# Patient Record
Sex: MALE
[De-identification: names, ages, dates, MRNs, and addresses within clinical notes are randomized per-mention and may not be internally consistent; named-entity substitution may affect disease eponyms.]

---

## 2024-07-10 DIAGNOSIS — K80.20 CALCULUS OF GALLBLADDER W/OUT CHOLECYSTITIS W/OUT OBSTRUCTION: ICD-10-CM

## 2024-07-10 DIAGNOSIS — Z87.11 PERSONAL HISTORY OF PEPTIC ULCER DISEASE: ICD-10-CM

## 2024-07-10 DIAGNOSIS — Z87.891 PERSONAL HISTORY OF NICOTINE DEPENDENCE: ICD-10-CM

## 2024-07-10 DIAGNOSIS — Z87.438 PERSONAL HISTORY OF OTHER DISEASES OF MALE GENITAL ORGANS: ICD-10-CM

## 2024-07-10 DIAGNOSIS — Z86.010 PERSONAL HISTORY OF COLONIC POLYPS: ICD-10-CM

## 2024-07-10 PROBLEM — Z00.00 ENCOUNTER FOR PREVENTIVE HEALTH EXAMINATION: Status: ACTIVE | Noted: 2024-07-10

## 2024-07-10 RX ORDER — DICYCLOMINE HYDROCHLORIDE 20 MG/1
20 TABLET ORAL
Refills: 0 | Status: ACTIVE | COMMUNITY

## 2024-07-10 RX ORDER — LINACLOTIDE 145 UG/1
145 CAPSULE, GELATIN COATED ORAL
Refills: 0 | Status: ACTIVE | COMMUNITY

## 2024-07-10 RX ORDER — OMEPRAZOLE 40 MG/1
40 CAPSULE, DELAYED RELEASE ORAL
Refills: 0 | Status: ACTIVE | COMMUNITY

## 2024-07-10 RX ORDER — DOCUSATE SODIUM 100 MG/1
100 CAPSULE ORAL
Refills: 0 | Status: ACTIVE | COMMUNITY

## 2024-07-10 RX ORDER — PRUCALOPRIDE 2 MG/1
2 TABLET, FILM COATED ORAL
Refills: 0 | Status: ACTIVE | COMMUNITY

## 2024-07-10 RX ORDER — ALFUZOSIN HYDROCHLORIDE 10 MG/1
10 TABLET, EXTENDED RELEASE ORAL
Refills: 0 | Status: ACTIVE | COMMUNITY

## 2024-07-10 RX ORDER — DEXLANSOPRAZOLE 30 MG/1
30 CAPSULE, DELAYED RELEASE ORAL
Refills: 0 | Status: ACTIVE | COMMUNITY

## 2024-07-10 RX ORDER — SIMETHICONE CHEW TAB 80 MG 80 MG
80 TABLET ORAL
Refills: 0 | Status: ACTIVE | COMMUNITY

## 2024-07-10 RX ORDER — LACTULOSE 10 G/15ML
10 SOLUTION ORAL
Refills: 0 | Status: ACTIVE | COMMUNITY

## 2024-07-10 RX ORDER — BISACODYL 5 MG/1
5 TABLET ORAL
Refills: 0 | Status: ACTIVE | COMMUNITY

## 2024-07-10 RX ORDER — LINACLOTIDE 290 UG/1
290 CAPSULE, GELATIN COATED ORAL
Refills: 0 | Status: ACTIVE | COMMUNITY

## 2024-07-10 RX ORDER — SENNOSIDES 8.6 MG/1
8.6 TABLET ORAL
Refills: 0 | Status: ACTIVE | COMMUNITY

## 2024-07-10 RX ORDER — TAMSULOSIN HYDROCHLORIDE 0.4 MG/1
0.4 CAPSULE ORAL
Refills: 0 | Status: ACTIVE | COMMUNITY

## 2024-07-10 RX ORDER — ROSUVASTATIN CALCIUM 10 MG/1
10 TABLET, FILM COATED ORAL
Refills: 0 | Status: ACTIVE | COMMUNITY

## 2024-07-10 RX ORDER — DUTASTERIDE 0.5 MG/1
0.5 CAPSULE, LIQUID FILLED ORAL
Refills: 0 | Status: ACTIVE | COMMUNITY

## 2024-07-10 RX ORDER — URSODIOL 300 MG/1
300 CAPSULE ORAL
Refills: 0 | Status: ACTIVE | COMMUNITY

## 2024-07-17 ENCOUNTER — APPOINTMENT (OUTPATIENT)
Dept: COLORECTAL SURGERY | Facility: CLINIC | Age: 74
End: 2024-07-17
Payer: MEDICARE

## 2024-07-17 DIAGNOSIS — K59.01 SLOW TRANSIT CONSTIPATION: ICD-10-CM

## 2024-07-17 PROCEDURE — 99204 OFFICE O/P NEW MOD 45 MIN: CPT

## 2024-07-18 VITALS
DIASTOLIC BLOOD PRESSURE: 70 MMHG | HEIGHT: 62 IN | WEIGHT: 109 LBS | TEMPERATURE: 97.5 F | BODY MASS INDEX: 20.06 KG/M2 | SYSTOLIC BLOOD PRESSURE: 114 MMHG | HEART RATE: 68 BPM

## 2025-03-19 ENCOUNTER — NON-APPOINTMENT (OUTPATIENT)
Age: 75
End: 2025-03-19

## 2025-03-19 ENCOUNTER — APPOINTMENT (OUTPATIENT)
Dept: COLORECTAL SURGERY | Facility: CLINIC | Age: 75
End: 2025-03-19
Payer: MEDICARE

## 2025-03-19 VITALS
TEMPERATURE: 98.24 F | HEIGHT: 62 IN | DIASTOLIC BLOOD PRESSURE: 67 MMHG | BODY MASS INDEX: 20.24 KG/M2 | SYSTOLIC BLOOD PRESSURE: 125 MMHG | WEIGHT: 110 LBS | HEART RATE: 62 BPM

## 2025-03-19 DIAGNOSIS — K59.01 SLOW TRANSIT CONSTIPATION: ICD-10-CM

## 2025-03-19 PROCEDURE — 99215 OFFICE O/P EST HI 40 MIN: CPT

## 2025-04-08 ENCOUNTER — INPATIENT (INPATIENT)
Facility: HOSPITAL | Age: 75
LOS: 4 days | Discharge: HOME | DRG: 330 | End: 2025-04-13
Attending: SURGERY | Admitting: SURGERY
Payer: MEDICARE

## 2025-04-08 VITALS
HEART RATE: 65 BPM | RESPIRATION RATE: 18 BRPM | WEIGHT: 109.13 LBS | HEIGHT: 64 IN | DIASTOLIC BLOOD PRESSURE: 81 MMHG | SYSTOLIC BLOOD PRESSURE: 124 MMHG | TEMPERATURE: 98 F | OXYGEN SATURATION: 98 %

## 2025-04-08 DIAGNOSIS — Z98.890 OTHER SPECIFIED POSTPROCEDURAL STATES: Chronic | ICD-10-CM

## 2025-04-08 LAB
ALBUMIN SERPL ELPH-MCNC: 4.1 G/DL — SIGNIFICANT CHANGE UP (ref 3.3–5)
ALP SERPL-CCNC: 61 U/L — SIGNIFICANT CHANGE UP (ref 40–120)
ALT FLD-CCNC: 16 U/L — SIGNIFICANT CHANGE UP (ref 10–45)
ANION GAP SERPL CALC-SCNC: 12 MMOL/L — SIGNIFICANT CHANGE UP (ref 5–17)
APPEARANCE UR: CLEAR — SIGNIFICANT CHANGE UP
APTT BLD: 33.9 SEC — SIGNIFICANT CHANGE UP (ref 24.5–35.6)
AST SERPL-CCNC: 19 U/L — SIGNIFICANT CHANGE UP (ref 10–40)
BASOPHILS # BLD AUTO: 0.02 K/UL — SIGNIFICANT CHANGE UP (ref 0–0.2)
BASOPHILS NFR BLD AUTO: 0.4 % — SIGNIFICANT CHANGE UP (ref 0–2)
BILIRUB SERPL-MCNC: 0.3 MG/DL — SIGNIFICANT CHANGE UP (ref 0.2–1.2)
BILIRUB UR-MCNC: NEGATIVE — SIGNIFICANT CHANGE UP
BLD GP AB SCN SERPL QL: NEGATIVE — SIGNIFICANT CHANGE UP
BUN SERPL-MCNC: 17 MG/DL — SIGNIFICANT CHANGE UP (ref 7–23)
CALCIUM SERPL-MCNC: 8.6 MG/DL — SIGNIFICANT CHANGE UP (ref 8.4–10.5)
CHLORIDE SERPL-SCNC: 104 MMOL/L — SIGNIFICANT CHANGE UP (ref 96–108)
CO2 SERPL-SCNC: 26 MMOL/L — SIGNIFICANT CHANGE UP (ref 22–31)
COLOR SPEC: YELLOW — SIGNIFICANT CHANGE UP
CREAT SERPL-MCNC: 1.12 MG/DL — SIGNIFICANT CHANGE UP (ref 0.5–1.3)
DIFF PNL FLD: NEGATIVE — SIGNIFICANT CHANGE UP
EGFR: 69 ML/MIN/1.73M2 — SIGNIFICANT CHANGE UP
EGFR: 69 ML/MIN/1.73M2 — SIGNIFICANT CHANGE UP
EOSINOPHIL # BLD AUTO: 0.07 K/UL — SIGNIFICANT CHANGE UP (ref 0–0.5)
EOSINOPHIL NFR BLD AUTO: 1.4 % — SIGNIFICANT CHANGE UP (ref 0–6)
GLUCOSE SERPL-MCNC: 125 MG/DL — HIGH (ref 70–99)
GLUCOSE UR QL: NEGATIVE MG/DL — SIGNIFICANT CHANGE UP
HCT VFR BLD CALC: 37.4 % — LOW (ref 39–50)
HGB BLD-MCNC: 12.7 G/DL — LOW (ref 13–17)
IMM GRANULOCYTES NFR BLD AUTO: 0.2 % — SIGNIFICANT CHANGE UP (ref 0–0.9)
INR BLD: 0.89 — SIGNIFICANT CHANGE UP (ref 0.85–1.16)
KETONES UR-MCNC: ABNORMAL MG/DL
LEUKOCYTE ESTERASE UR-ACNC: NEGATIVE — SIGNIFICANT CHANGE UP
LYMPHOCYTES # BLD AUTO: 0.95 K/UL — LOW (ref 1–3.3)
LYMPHOCYTES # BLD AUTO: 18.5 % — SIGNIFICANT CHANGE UP (ref 13–44)
MCHC RBC-ENTMCNC: 30.3 PG — SIGNIFICANT CHANGE UP (ref 27–34)
MCHC RBC-ENTMCNC: 34 G/DL — SIGNIFICANT CHANGE UP (ref 32–36)
MCV RBC AUTO: 89.3 FL — SIGNIFICANT CHANGE UP (ref 80–100)
MONOCYTES # BLD AUTO: 0.44 K/UL — SIGNIFICANT CHANGE UP (ref 0–0.9)
MONOCYTES NFR BLD AUTO: 8.6 % — SIGNIFICANT CHANGE UP (ref 2–14)
NEUTROPHILS # BLD AUTO: 3.65 K/UL — SIGNIFICANT CHANGE UP (ref 1.8–7.4)
NEUTROPHILS NFR BLD AUTO: 70.9 % — SIGNIFICANT CHANGE UP (ref 43–77)
NITRITE UR-MCNC: NEGATIVE — SIGNIFICANT CHANGE UP
NRBC BLD AUTO-RTO: 0 /100 WBCS — SIGNIFICANT CHANGE UP (ref 0–0)
PH UR: 7 — SIGNIFICANT CHANGE UP (ref 5–8)
PLATELET # BLD AUTO: 172 K/UL — SIGNIFICANT CHANGE UP (ref 150–400)
POTASSIUM SERPL-MCNC: 3.8 MMOL/L — SIGNIFICANT CHANGE UP (ref 3.5–5.3)
POTASSIUM SERPL-SCNC: 3.8 MMOL/L — SIGNIFICANT CHANGE UP (ref 3.5–5.3)
PROT SERPL-MCNC: 6.5 G/DL — SIGNIFICANT CHANGE UP (ref 6–8.3)
PROT UR-MCNC: NEGATIVE MG/DL — SIGNIFICANT CHANGE UP
PROTHROM AB SERPL-ACNC: 10.2 SEC — SIGNIFICANT CHANGE UP (ref 9.9–13.4)
RBC # BLD: 4.19 M/UL — LOW (ref 4.2–5.8)
RBC # FLD: 14 % — SIGNIFICANT CHANGE UP (ref 10.3–14.5)
RH IG SCN BLD-IMP: POSITIVE — SIGNIFICANT CHANGE UP
SODIUM SERPL-SCNC: 142 MMOL/L — SIGNIFICANT CHANGE UP (ref 135–145)
SP GR SPEC: >1.03 — HIGH (ref 1–1.03)
UROBILINOGEN FLD QL: 1 MG/DL — SIGNIFICANT CHANGE UP (ref 0.2–1)
WBC # BLD: 5.14 K/UL — SIGNIFICANT CHANGE UP (ref 3.8–10.5)
WBC # FLD AUTO: 5.14 K/UL — SIGNIFICANT CHANGE UP (ref 3.8–10.5)

## 2025-04-08 PROCEDURE — 93010 ELECTROCARDIOGRAM REPORT: CPT

## 2025-04-08 PROCEDURE — 99285 EMERGENCY DEPT VISIT HI MDM: CPT

## 2025-04-08 PROCEDURE — 74177 CT ABD & PELVIS W/CONTRAST: CPT | Mod: 26

## 2025-04-08 PROCEDURE — 99222 1ST HOSP IP/OBS MODERATE 55: CPT

## 2025-04-08 PROCEDURE — 71045 X-RAY EXAM CHEST 1 VIEW: CPT | Mod: 26

## 2025-04-08 RX ORDER — ROSUVASTATIN CALCIUM 5 MG/1
10 TABLET, FILM COATED ORAL AT BEDTIME
Refills: 0 | Status: DISCONTINUED | OUTPATIENT
Start: 2025-04-08 | End: 2025-04-10

## 2025-04-08 RX ORDER — BISACODYL 5 MG
10 TABLET, DELAYED RELEASE (ENTERIC COATED) ORAL ONCE
Refills: 0 | Status: COMPLETED | OUTPATIENT
Start: 2025-04-08 | End: 2025-04-08

## 2025-04-08 RX ORDER — TAMSULOSIN HYDROCHLORIDE 0.4 MG/1
0.4 CAPSULE ORAL AT BEDTIME
Refills: 0 | Status: DISCONTINUED | OUTPATIENT
Start: 2025-04-08 | End: 2025-04-09

## 2025-04-08 RX ORDER — ACETAMINOPHEN 500 MG/5ML
750 LIQUID (ML) ORAL EVERY 6 HOURS
Refills: 0 | Status: DISCONTINUED | OUTPATIENT
Start: 2025-04-08 | End: 2025-04-10

## 2025-04-08 RX ORDER — IOHEXOL 350 MG/ML
30 INJECTION, SOLUTION INTRAVENOUS ONCE
Refills: 0 | Status: COMPLETED | OUTPATIENT
Start: 2025-04-08 | End: 2025-04-08

## 2025-04-08 RX ORDER — HEPARIN SODIUM 1000 [USP'U]/ML
5000 INJECTION INTRAVENOUS; SUBCUTANEOUS EVERY 8 HOURS
Refills: 0 | Status: DISCONTINUED | OUTPATIENT
Start: 2025-04-08 | End: 2025-04-10

## 2025-04-08 RX ORDER — POLYETHYLENE GLYCOL 3350 17 G/17G
17 POWDER, FOR SOLUTION ORAL ONCE
Refills: 0 | Status: COMPLETED | OUTPATIENT
Start: 2025-04-08 | End: 2025-04-08

## 2025-04-08 RX ORDER — ONDANSETRON HCL/PF 4 MG/2 ML
4 VIAL (ML) INJECTION EVERY 6 HOURS
Refills: 0 | Status: DISCONTINUED | OUTPATIENT
Start: 2025-04-08 | End: 2025-04-10

## 2025-04-08 RX ADMIN — IOHEXOL 30 MILLILITER(S): 350 INJECTION, SOLUTION INTRAVENOUS at 12:29

## 2025-04-08 RX ADMIN — Medication 10 MILLIGRAM(S): at 18:49

## 2025-04-08 RX ADMIN — HEPARIN SODIUM 5000 UNIT(S): 1000 INJECTION INTRAVENOUS; SUBCUTANEOUS at 21:46

## 2025-04-08 RX ADMIN — POLYETHYLENE GLYCOL 3350 17 GRAM(S): 17 POWDER, FOR SOLUTION ORAL at 18:49

## 2025-04-08 RX ADMIN — TAMSULOSIN HYDROCHLORIDE 0.4 MILLIGRAM(S): 0.4 CAPSULE ORAL at 21:45

## 2025-04-08 NOTE — H&P ADULT - HISTORY OF PRESENT ILLNESS
75yo Cantonese-speaking Male pt with PMH of Gastric ulcers, incompetent ileocecal valve, bowel obstruction (2021) and PSHx of Exlap with Mike patch repair (1980s) and gastrectomy (1980) that is a patient of Dr. De Luna being treated for chronic constipation associated with increased bloating. Patient has been seen by many outpatient GI doctors and has failed trials of miralax, enemas, etc. Patient last saw Dr. De Luna on 3/19 and discussed potential total colectomy with possible ileosigmoid anastomosis with DLI to relieve chronic constipation 2/2 delayed transit and was scheduled for surgery 4/10. 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 Distal ileal bowel loops are also dilated" and was called by outpatient office today to come to ED for admission and preop optimization for surgery Thursday.    In the ED, pt afebrile, nontachycardic, normotensive, and satting on RA. On exam, Abdomen is soft, moderately distended, NT without rebound or guarding. Labs including CBC and CMP unremarkable. Patient currently denies pain, nausea, emesis, fever/chills, or constipation. Patient's last BM was this morning. Patient reports since CT scan last week he has been taking chinese herbal supplements that have caused relief of symptoms leading to 2-3x daily BMs (small caliber). Patient is inquiring if surgery is necessary as he has been feeling better for the last few days. He still reports bloated feeling but states his herbal supplement relieves bloating discomfort with flatus. 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 After repeat CT imaging results was discussed with patient, patient agreed to be admitted and undergo surgery.    PMH: Gastric ulcers, incompetent ileocecal valve, bowel obstruction (2021)  PSHx: Exlap with Mike patch repair (1980s) and gastrectomy (1980)  Medications: linzess, motegrity, and senna  Allergies: NKDA  Social Hx: Former 20pck yr smoker (quit 20yrs ago)  Family Hx: Denies family hx of IBS, Crohn's, UC, or colon cancer.  Last colonoscopy: May 2024- moderately congested erythematous mucosa in the transverse colon, in the ascending colon and in the cecum. There was narrowing in the transverse colon with proximal dilation which was suctioned to decompress on withdrawal. No discrete mass or stricture identified. Found to have polyps in the anus, s/p biopsied. resection not attempted due to poor prep  Last EGD: 2-3yrs ago- chronic inflammation  
cont cardura

## 2025-04-08 NOTE — H&P ADULT - NSHPLABSRESULTS_GEN_ALL_CORE
LABS:                     12.7   5.14  )-----------( 172      ( 08 Apr 2025 10:47 )             37.4   04-08  142  |  104  |  17  ----------------------------<  125[H]  3.8   |  26  |  1.12  Ca    8.6      08 Apr 2025 10:47  TPro  6.5  /  Alb  4.1  /  TBili  0.3  /  DBili  x   /  AST  19  /  ALT  16  /  AlkPhos  61  04-08  PT/INR - ( 08 Apr 2025 10:47 )   PT: 10.2 sec;   INR: 0.89     PTT - ( 08 Apr 2025 10:47 )  PTT:33.9 sec    CT Abdomen and Pelvis w/ Oral Cont and w/ IV Cont:   ACC: [id number] EXAM:  CT ABDOMEN AND PELVIS OC IC   ORDERED BY: SHANTANU BARNEY     PROCEDURE DATE:  04/08/2025          INTERPRETATION:  CLINICAL INFORMATION: Lower abdominal pain.    COMPARISON: Outside CT abdomen and pelvis and MRI May 1, 2024,    CONTRAST/COMPLICATIONS:  IV Contrast: Isovue 370  90 cc administered   10 cc discarded  Oral Contrast: Omnipaque 350  .    PROCEDURE:  CT of the Abdomen and Pelvis was performed.  Sagittal and coronal reformats were performed.    FINDINGS:  LOWER CHEST: Linear scar at right lung base. Right posterior basilar   subpleural micronodule.    LIVER: Within normal limits.  BILE DUCTS: Unchanged mild intra hepatic biliary ductal dilatation,   similar to prior. No significant extrahepatic duct dilatation.  GALLBLADDER: Within normal limits.  SPLEEN: Within normal limits.  PANCREAS: Within normal limits.  ADRENALS: Within normal limits.  KIDNEYS/URETERS: Within normal limits.    BLADDER: Within normal limits.  REPRODUCTIVE ORGANS: Prostate withinnormal limits.    BOWEL: Air and liquid-filled markedly dilated transverse colon up to 14.4   cm with marked smooth luminal narrowing/transition point at the level of   the splenic flexure, similar to prior exams. No mass. Small air and fecal   matter is present distally in nondilated large bowel and rectosigmoid.   Right colon is air-filled dilated measuring up to 8.4 cm.  Appendix is   normal. No pneumatosis or significant wall thickening. Distal small bowel   loops are filled with fecal matterdue to delayed transit or incompetent   sphincter. Terminal ileum is collapsed.  PERITONEUM/RETROPERITONEUM: Small pelvic free fluid.  VESSELS: Atherosclerotic changes.  LYMPH NODES: No lymphadenopathy.  ABDOMINAL WALL: Within normal limits.  BONES: Degenerative changes.    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   No evidence of ischemia.    --- End of Report ---          SVEN BARRERA MD; Resident Radiologist  This document has been electronically signed.  EMERSON MARTÍNEZ MD; Attending Radiologist  This document has been electronically signed. Apr 8 2025  3:29PM (04-08-25 @ 14:01)

## 2025-04-08 NOTE — PATIENT PROFILE ADULT - FALL HARM RISK - UNIVERSAL INTERVENTIONS
Bed in lowest position, wheels locked, appropriate side rails in place/Call bell, personal items and telephone in reach/Instruct patient to call for assistance before getting out of bed or chair/Non-slip footwear when patient is out of bed/[place] to call system/Physically safe environment - no spills, clutter or unnecessary equipment/Purposeful Proactive Rounding/Room/bathroom lighting operational, light cord in reach

## 2025-04-08 NOTE — ED PROVIDER NOTE - OBJECTIVE STATEMENT
jeramy  #[id number]    73 y/o m presents c/o persistent lower abd pain, constipation.  Pt stating sx have been ongoing for several months, following with surgeon Dr. De Luna outpatient.  Pt stating  he is scheduled to have hemicolectomy this week with Dr. De Luna for his symptoms but his pain worsened over the weekend and states he called the office and was told to come to ED.  Pt reports mild discomfort currently which has been waxing/waning.  Denies fever, chills, dysuria, n/v, CP, SOB, all other ROS negative.

## 2025-04-08 NOTE — CONSULT NOTE ADULT - ASSESSMENT
75yo Cantonese/Mandrain-speaking Male pt with PMH of Gastric ulcers, incompetent ileocecal valve, bowel obstruction (2021) and PSHx of Exlap with Mike patch repair (1980s) and gastrectomy (1980) that is a patient of Dr. De Luna being treated for chronic constipation associated with increased bloating. Patient has been seen by many outpatient GI doctors and has failed trials of miralax, enemas, etc. Patient last saw Dr. De Luna on 3/19 and discussed potential total colectomy with possible ileosigmoid anastomosis with DLI to relieve chronic constipation 2/2 delayed transit and was scheduled for surgery 4/10. 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    - diet per surgery  - if npo - to give IVF for hydration  - hx of recurrent constipation    - hx of BPH taking meds- did not recall the name  would give Tamsulosin 0.4 mg po q daily    reported HLD and stating medication at home.   no prior cardiac history ,   baseline good functional status reported.   would like to see EKG     - pre-op assesement to be completed  current information - does not have contra-indication for surgery     thank you for allowing medicine to participate in the care, dw primary team. explained to patient ( writer can speak Mandarin and can communicate directly with patient   he was sent in directly from pcp office - wife at home   pcp is Fausto Gonzalez.

## 2025-04-08 NOTE — PATIENT PROFILE ADULT - ARRIVAL FROM
Home

## 2025-04-08 NOTE — ED PROVIDER NOTE - NS ED MD DISPO ADMIT [facility] PALLIATIVE CARE
No changes to pump settings     Move Dexcom sensor to different parts of body,  avoid areas with loose skin     Avoid disconnecting from pump for more than 30min-1hr at a time     Call to request switch to Dexcom G7     Start Rosuvastatin 5mg once weekly for 1 month.  If tolerated, try increasing to 3 days/week for 1 month.  Then, increase to daily as tolerated    Go for fasting blood work before next visit     ____________________________________________________________________________________    Get [place] copy most recent 1040 form asap to start process for  patient assistance    Patient Assistance Program    You may be able to get some of your medications covered at no cost to you if:   You currently have prescription insurance.  You meet the income requirements below based on your household size.     Total Number of People  in your Household: 1 2 3 4 5   Annual Household Income: $60,240 $81,760 $103,280 $124,800 $146,320     NEXT STEPS:  If you believe you qualify for the  Patient Assistance Program, you MUST complete the following steps to start the application process.   Locate one of the following required documents for proof of income:   A copy of your most recent 1040 Form.  If you do NOT file taxes, a copy of your most recent Social Security Benefit Statement.   Drop off or fax ([phone]) to our office.   Once received, one of our pharmacists will contact you to continue the process.     PLEASE ALLOW 1-2 WEEKS TO HEAR FROM THE PHARMACIST.     NONE

## 2025-04-08 NOTE — ED ADULT TRIAGE NOTE - CHIEF COMPLAINT QUOTE
Pt BIB NP from [facility]. Per NP "Patient is scheduled for surgery for hemicolectomy on Thursday and presents today with abdominal pain and [facility] wanted patient to be preadmited"  Hx chronic constipation, bph, GERD, HLD.  Pt endorsed abd discomfort and distention. Able to pas gas this morning.

## 2025-04-08 NOTE — ED ADULT NURSE NOTE - NSFALLRISK_ED_ALL_ED
Consults    Patient Care Team:  Lindy Munroe PA-C as PCP - General (Physician Assistant)    Chief complaint: Left hand ischemia  Subjective     37-year-old male admitted to the observation unit presenting with left hand pain and ischemia of the distal portions of the fourth and fifth digits of the left hand.  He is an IV drug abuser who states 2 weeks ago he had an episode of relapse and injected into his left antecubital fossa.  The patient is afebrile.  No evidence of PE on CT PE protocol.  Echocardiogram is pending but no other evidence for septic emboli symptomatically or clinically as the only areas of ischemia are in the left hand in the distal fourth and fifth digits with no evidence of wet gangrene.        Review of Systems   Constitutional: Negative for activity change, appetite change, chills and fever.   HENT: Negative for sore throat and trouble swallowing.    Eyes: Negative for visual disturbance.   Respiratory: Negative for cough and shortness of breath.    Cardiovascular: Negative for chest pain and palpitations.   Gastrointestinal: Negative for abdominal distention, abdominal pain, blood in stool, constipation, diarrhea, nausea and vomiting.   Endocrine: Negative for cold intolerance and heat intolerance.   Genitourinary: Negative for dysuria.   Musculoskeletal: Negative for joint swelling.   Skin: Negative for color change, rash and wound.   Allergic/Immunologic: Negative for immunocompromised state.   Neurological: Negative for dizziness, seizures, weakness and headaches.   Hematological: Negative for adenopathy. Does not bruise/bleed easily.   Psychiatric/Behavioral: Negative for agitation and confusion.        Past Medical History:   Diagnosis Date   • Depression    • Hypertension    ,   Past Surgical History:   Procedure Laterality Date   • EYE SURGERY     , History reviewed. No pertinent family history.,   Social History   Substance Use Topics   • Smoking status: Current Every Day Smoker      Packs/day: 1.00   • Smokeless tobacco: Never Used   • Alcohol use No   ,   Prescriptions Prior to Admission   Medication Sig Dispense Refill Last Dose   • buprenorphine-naloxone (SUBOXONE) 8-2 MG film film Place 1 film under the tongue 3 (Three) Times a Day.   10/9/2018 at AM   • clonazePAM (KlonoPIN) 2 MG tablet Take 2 mg by mouth 2 (Two) Times a Day.   10/9/2018 at AM   • gabapentin (NEURONTIN) 800 MG tablet Take 800 mg by mouth 3 (Three) Times a Day.   10/9/2018 at AM   • lisinopril (PRINIVIL,ZESTRIL) 10 MG tablet Take 10 mg by mouth Daily.   10/6/2018 at Unknown Time   , Scheduled Meds:    aspirin 325 mg Oral Daily   buprenorphine-naloxone 1 tablet Sublingual TID   clonazePAM 2 mg Oral BID   enoxaparin 40 mg Subcutaneous Nightly   gabapentin 800 mg Oral Q8H   lisinopril 10 mg Oral Daily   povidone-iodine  Topical Q12H   sodium chloride 3 mL Intravenous Q12H   , Continuous Infusions:    sodium chloride 100 mL/hr Last Rate: 100 mL/hr (10/10/18 1300)   , PRN Meds:  •  acetaminophen  •  diphenhydrAMINE  •  nitroglycerin  •  ondansetron  •  sodium chloride  •  sodium chloride and Allergies:  Vancomycin    Objective      Vital Signs  Temp:  [96.2 °F (35.7 °C)-97.6 °F (36.4 °C)] 96.5 °F (35.8 °C)  Heart Rate:  [60-97] 65  Resp:  [16-18] 18  BP: (101-153)/(68-89) 133/87    Physical Exam   Constitutional: He is oriented to person, place, and time. He appears well-developed.   HENT:   Head: Normocephalic and atraumatic.   Mouth/Throat: Mucous membranes are normal.   Eyes: Pupils are equal, round, and reactive to light. Conjunctivae are normal.   Neck: Neck supple. No JVD present. No tracheal deviation present. No thyromegaly present.   Cardiovascular: Normal rate and regular rhythm.  Exam reveals no gallop and no friction rub.    No murmur heard.  Pulmonary/Chest: Effort normal and breath sounds normal.   Abdominal: Soft. He exhibits no distension. There is no splenomegaly or hepatomegaly. There is no tenderness. No  hernia.   Musculoskeletal: Normal range of motion. He exhibits no deformity.   Left hand distal fourth and fifth digits with ischemic change without necrosis   Neurological: He is alert and oriented to person, place, and time.   Skin: Skin is warm and dry.   Psychiatric: He has a normal mood and affect.       Results Review:    I reviewed the patient's new clinical results.        Assessment/Plan       Septic embolism (CMS/[facility])      Assessment:  (37-year-old male with distal ischemia of the left fourth and fifth digits of the hand.  No evidence of necrosis.  He has palpable radial and ulnar pulses in the left.).     Plan:   (Pain with Betadine daily daily and continue to monitor.  Hopefully he will develop collateral flow and had minimal necrosis of tissue or tissue loss.  As long as there is no evidence of wet gangrene if necrosis develops or persists there will likely autoamputated and require no surgical amputation.  If he develops wet gangrene may require surgical amputation.  Follow-up in my office in one week for evaluation.).       I discussed the patients findings and my recommendations with patient, family and consulting provider    Capo Storm MD  10/10/18  3:34 PM         No

## 2025-04-08 NOTE — ED PROVIDER NOTE - CLINICAL SUMMARY MEDICAL DECISION MAKING FREE TEXT BOX
[age] y/o m presents c/o ongoing sx of lower abd pain, constipation, advised by surgeon Dr. De Luna office to come to ED.  Pt afebrile in ED, nontoxic appearing on exam, mild tenderness to lower abd.  CTs done outpatient suggesting decreased colonic motility.  Pt seen by surgery in ED, recommend repeat CT a/p with oral and IV contrast, will f/u results and surgery recs.

## 2025-04-08 NOTE — CONSULT NOTE ADULT - SUBJECTIVE AND OBJECTIVE BOX
HPI: from record review and from patient.     75yo Cantonese/Mandrain-speaking Male pt with PMH of Gastric ulcers, incompetent ileocecal valve, bowel obstruction (2021) and PSHx of Exlap with Mike patch repair (1980s) and gastrectomy (1980) that is a patient of Dr. De Luna being treated for chronic constipation associated with increased bloating. Patient has been seen by many outpatient GI doctors and has failed trials of miralax, enemas, etc. Patient last saw Dr. De Luna on 3/19 and discussed potential total colectomy with possible ileosigmoid anastomosis with DLI to relieve chronic constipation 2/2 delayed transit and was scheduled for surgery 4/10. 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 Distal ileal bowel loops are also dilated" and was called by outpatient office today to come to ED for admission and preop optimization for surgery Thursday.    In the ED, pt afebrile, nontachycardic, normotensive, and satting on RA. On exam, Abdomen is soft, moderately distended, NT without rebound or guarding. Labs including CBC and CMP unremarkable. Patient currently denies pain, nausea, emesis, fever/chills, or constipation. Patient's last BM was this morning. Patient reports since CT scan last week he has been taking chinese herbal supplements that have caused relief of symptoms leading to 2-3x daily BMs (small caliber). Patient is inquiring if surgery is necessary as he has been feeling better for the last few days. He still reports bloated feeling but states his herbal supplement relieves bloating discomfort with flatus. 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 After repeat CT imaging results was discussed with patient, patient agreed to be admitted and undergo surgery.    pt seen for medicine [facility]  - reported no significant cardiac disease.   - tolerated prior procedure well  - lives with wife.   - can ambulate for multiple blocks on level ground - fatigue in legs after climbing stairs, no chest pain.       PMH: Gastric ulcers, incompetent ileocecal valve, bowel obstruction (2021)  PSHx: Exlap with Mike patch repair (1980s) and gastrectomy (1980)  Medications: linzess, motegrity, and senna  Allergies: NKDA  Social Hx: Former 20pck yr smoker (quit 20yrs ago)  Family Hx: Denies family hx of IBS, Crohn's, UC, or colon cancer.  Last colonoscopy: May 2024- moderately congested erythematous mucosa in the transverse colon, in the ascending colon and in the cecum. There was narrowing in the transverse colon with proximal dilation which was suctioned to decompress on withdrawal. No discrete mass or stricture identified. Found to have polyps in the anus, s/p biopsied. resection not attempted due to poor prep  Last EGD: 2-3yrs ago- chronic inflammation   (08 Apr 2025 16:32)      PAST MEDICAL & SURGICAL HISTORY:  Gastric ulcer      H/O exploratory laparotomy        Home Medications: reported taking cholesterol meds and medication for prostate and for constipation.     Allergies    No Known Allergies    Intolerances      FAMILY HISTORY:  No pertinent family history in first degree relatives      Social History: live with wife.       REVIEW OF SYSTEMS:  12 points system reviewed all negative except in [place].        CURRENT MEDICATIONS:   acetaminophen   IVPB .. 750 milliGRAM(s) IV Intermittent every 6 hours PRN  bisacodyl 10 milliGRAM(s) Oral once  heparin   Injectable 5000 Unit(s) SubCutaneous every 8 hours  ondansetron Injectable 4 milliGRAM(s) IV Push every 6 hours PRN  polyethylene glycol 3350 17 Gram(s) Oral once      VITAL SIGNS, INS/OUTS (last 24 hours):  Vital Signs Last 24 Hrs  T(C): 36.4 (08 Apr 2025 15:52), Max: 36.5 (08 Apr 2025 10:00)  T(F): 97.6 (08 Apr 2025 15:52), Max: 97.7 (08 Apr 2025 10:00)  HR: 54 (08 Apr 2025 15:52) (54 - 65)  BP: 128/67 (08 Apr 2025 15:52) (124/81 - 128/67)  BP(mean): --  RR: 16 (08 Apr 2025 15:52) (16 - 18)  SpO2: 99% (08 Apr 2025 15:52) (98% - 99%)    Parameters below as of 08 Apr 2025 15:52  Patient On (Oxygen Delivery Method): room air      I&O's Summary      PHYSICAL EXAM:  General exam: awake alert, thin, standing near the bed, saturating well on room air   speaking full sentence.   Neck: supple, trachea at midline  CVS : RRR, +S1/S2  Pulm: good air entry   Abd:  distended-bloated, bs present, no localized tenderness  Skin: warm and dry,  Ext:  no edema, no tenderness.  Neuro: AOx3, no gross focal neurological deficits  [place] : none.     BASIC LABS:                        12.7   5.14  )-----------( 172      ( 08 Apr 2025 10:47 )             37.4     04-08    142  |  104  |  17  ----------------------------<  125[H]  3.8   |  26  |  1.12    Ca    8.6      08 Apr 2025 10:47    TPro  6.5  /  Alb  4.1  /  TBili  0.3  /  DBili  x   /  AST  19  /  ALT  16  /  AlkPhos  61  04-08    PT/INR - ( 08 Apr 2025 10:47 )   PT: 10.2 sec;   INR: 0.89          PTT - ( 08 Apr 2025 10:47 )  PTT:33.9 sec  Urinalysis Basic - ( 08 Apr 2025 10:47 )    Color: Yellow / Appearance: Clear / SG: >1.030 / pH: x  Gluc: 125 mg/dL / Ketone: Trace mg/dL  / Bili: Negative / Urobili: 1.0 mg/dL   Blood: x / Protein: Negative mg/dL / Nitrite: Negative   Leuk Esterase: Negative / RBC: x / WBC x   Sq Epi: x / Non Sq Epi: x / Bacteria: x      CAPILLARY BLOOD GLUCOSE          OTHER LABS:        MICRODATA:    Urinalysis with Rflx Culture (collected 08 Apr 2025 10:47)        IMAGING:- CT abdomen - Markedly dilated transverse colon, transition point at the splenic   flexure, similar to 5/1/2024, likely pseudoobstruction/[place] syndrome.   No evidence of ischemia.    chest x ray -: Heart size, mediastinal and hilar contours are within normal   limits for the patient's stated age. Again noted is calcification of the   aortic knob. The lung zones are clear. No acute soft tissue or osseous   pathology.      EKG: to locate     #Diet -       #DVT PPx -

## 2025-04-08 NOTE — H&P ADULT - ASSESSMENT
73yo Cantonese-speaking Male pt with PMH of Gastric ulcers, incompetent ileocecal valve, bowel obstruction (2021) and PSHx of Exlap with Mike patch repair (1980s) and gastrectomy (1980) that is a patient of Dr. De Luna being treated for chronic constipation associated with increased bloating which has failed multiple medical interventions. Repeat CT imaging consisted with severely dilated colon unchanged from CT scan last year. Patient admitted to [facility] surgery for further management of colonic dysmotility.    CLD  Pain and nausea prn  Home meds as appropriate  Miralax and dulcolax  SQH/SCD/OOBA  AM Labs

## 2025-04-08 NOTE — ED PROVIDER NOTE - ATTENDING APP SHARED VISIT CONTRIBUTION OF CARE
Pt is a 75yo m, Cantonese speaking, followed by Dr. De Luna for lower abd pain, constipation x months, with outpt ct a/p showing decreased colonic motility w/ colonic inertia, no obstruction, planned for a tyrell-colectomy, who was referred to ed by Dr. De Luna for worsening abd pain. No n/v/d, bloody stools, fever, chills. Is passing gas. Afebrile. HDS. Pt is well appearing. Abd soft, mildly distended, non-tender, no g/r. Labs unremarkable. Pt seen by surgery and requesting ct a/p to r/o obsruction. Dispo pending ct results and surg consult recommendations. hx obtained via Cantonese speaking .

## 2025-04-08 NOTE — ED ADULT NURSE NOTE - CAS TRG GEN SKIN COLOR
89 y/o m w/ PMH of AS s/p TAVR, CAD s/p DEWAYNE and CABG, hypothyroidism, HTN, HLD p/w Fall out of bed today while trying to go to the bathroom got pinned between the bed and the wall the family had to help get him up has no signs of trauma no real complaints able to actively range both hips at this time and knees ankles no signs of head injury at his baseline all labs were checked yesterday for completion sake pelvic x-ray was this time.
Normal for race

## 2025-04-08 NOTE — ED ADULT NURSE NOTE - OBJECTIVE STATEMENT
pt is a 75 yo M referred by colorectal outpatient office for hemicolectomy. c/o chronic bloating and constipation. last BM earlier today.  pt in nad, a&ox4. abd round and distended, nontender. ambulatory independently.

## 2025-04-09 LAB
ANION GAP SERPL CALC-SCNC: 10 MMOL/L — SIGNIFICANT CHANGE UP (ref 5–17)
BUN SERPL-MCNC: 13 MG/DL — SIGNIFICANT CHANGE UP (ref 7–23)
CALCIUM SERPL-MCNC: 8.5 MG/DL — SIGNIFICANT CHANGE UP (ref 8.4–10.5)
CHLORIDE SERPL-SCNC: 108 MMOL/L — SIGNIFICANT CHANGE UP (ref 96–108)
CO2 SERPL-SCNC: 23 MMOL/L — SIGNIFICANT CHANGE UP (ref 22–31)
CREAT SERPL-MCNC: 1.03 MG/DL — SIGNIFICANT CHANGE UP (ref 0.5–1.3)
EGFR: 77 ML/MIN/1.73M2 — SIGNIFICANT CHANGE UP
EGFR: 77 ML/MIN/1.73M2 — SIGNIFICANT CHANGE UP
GLUCOSE SERPL-MCNC: 76 MG/DL — SIGNIFICANT CHANGE UP (ref 70–99)
HCT VFR BLD CALC: 37.4 % — LOW (ref 39–50)
HGB BLD-MCNC: 12.5 G/DL — LOW (ref 13–17)
MAGNESIUM SERPL-MCNC: 2.3 MG/DL — SIGNIFICANT CHANGE UP (ref 1.6–2.6)
MCHC RBC-ENTMCNC: 30.4 PG — SIGNIFICANT CHANGE UP (ref 27–34)
MCHC RBC-ENTMCNC: 33.4 G/DL — SIGNIFICANT CHANGE UP (ref 32–36)
MCV RBC AUTO: 91 FL — SIGNIFICANT CHANGE UP (ref 80–100)
NRBC BLD AUTO-RTO: 0 /100 WBCS — SIGNIFICANT CHANGE UP (ref 0–0)
PHOSPHATE SERPL-MCNC: 3.4 MG/DL — SIGNIFICANT CHANGE UP (ref 2.5–4.5)
PLATELET # BLD AUTO: 161 K/UL — SIGNIFICANT CHANGE UP (ref 150–400)
POTASSIUM SERPL-MCNC: 3.8 MMOL/L — SIGNIFICANT CHANGE UP (ref 3.5–5.3)
POTASSIUM SERPL-SCNC: 3.8 MMOL/L — SIGNIFICANT CHANGE UP (ref 3.5–5.3)
RBC # BLD: 4.11 M/UL — LOW (ref 4.2–5.8)
RBC # FLD: 14.1 % — SIGNIFICANT CHANGE UP (ref 10.3–14.5)
SODIUM SERPL-SCNC: 141 MMOL/L — SIGNIFICANT CHANGE UP (ref 135–145)
WBC # BLD: 4.57 K/UL — SIGNIFICANT CHANGE UP (ref 3.8–10.5)
WBC # FLD AUTO: 4.57 K/UL — SIGNIFICANT CHANGE UP (ref 3.8–10.5)

## 2025-04-09 PROCEDURE — 99231 SBSQ HOSP IP/OBS SF/LOW 25: CPT

## 2025-04-09 PROCEDURE — 99232 SBSQ HOSP IP/OBS MODERATE 35: CPT

## 2025-04-09 RX ORDER — SODIUM CHLORIDE 9 G/1000ML
1000 INJECTION, SOLUTION INTRAVENOUS
Refills: 0 | Status: DISCONTINUED | OUTPATIENT
Start: 2025-04-09 | End: 2025-04-10

## 2025-04-09 RX ORDER — BISACODYL 5 MG
10 TABLET, DELAYED RELEASE (ENTERIC COATED) ORAL
Refills: 0 | Status: COMPLETED | OUTPATIENT
Start: 2025-04-09 | End: 2025-04-09

## 2025-04-09 RX ORDER — TAMSULOSIN HYDROCHLORIDE 0.4 MG/1
0.8 CAPSULE ORAL AT BEDTIME
Refills: 0 | Status: DISCONTINUED | OUTPATIENT
Start: 2025-04-09 | End: 2025-04-10

## 2025-04-09 RX ORDER — TAMSULOSIN HYDROCHLORIDE 0.4 MG/1
2 CAPSULE ORAL
Refills: 0 | DISCHARGE

## 2025-04-09 RX ORDER — PRUCALOPRIDE 2 MG/1
1 TABLET ORAL
Refills: 0 | DISCHARGE

## 2025-04-09 RX ORDER — NEOMYCIN SULFATE 87.5 MG/5ML
1000 SOLUTION ORAL
Refills: 0 | Status: COMPLETED | OUTPATIENT
Start: 2025-04-09 | End: 2025-04-09

## 2025-04-09 RX ORDER — DUTASTERIDE 0.5 MG/1
1 CAPSULE, LIQUID FILLED ORAL
Refills: 0 | DISCHARGE

## 2025-04-09 RX ORDER — B1/B2/B3/B5/B6/B12/VIT C/FOLIC 500-0.5 MG
0 TABLET ORAL
Refills: 0 | DISCHARGE

## 2025-04-09 RX ORDER — METRONIDAZOLE 250 MG
500 TABLET ORAL
Refills: 0 | Status: COMPLETED | OUTPATIENT
Start: 2025-04-09 | End: 2025-04-09

## 2025-04-09 RX ORDER — CALCITRIOL 0.5 UG/1
50000 CAPSULE, GELATIN COATED ORAL
Refills: 0 | DISCHARGE

## 2025-04-09 RX ORDER — DOCUSATE SODIUM 100 MG
1 CAPSULE ORAL
Refills: 0 | DISCHARGE

## 2025-04-09 RX ORDER — MAGNESIUM OXIDE/MAG AA CHELATE 133 MG
4 TABLET ORAL
Refills: 0 | DISCHARGE

## 2025-04-09 RX ORDER — POLYETHYLENE GLYCOL-3350 AND ELECTROLYTES 236; 6.74; 5.86; 2.97; 22.74 G/274.31G; G/274.31G; G/274.31G; G/274.31G; G/274.31G
4000 POWDER, FOR SOLUTION ORAL ONCE
Refills: 0 | Status: COMPLETED | OUTPATIENT
Start: 2025-04-09 | End: 2025-04-09

## 2025-04-09 RX ADMIN — NEOMYCIN SULFATE 1000 MILLIGRAM(S): 87.5 SOLUTION ORAL at 17:15

## 2025-04-09 RX ADMIN — ROSUVASTATIN CALCIUM 10 MILLIGRAM(S): 5 TABLET, FILM COATED ORAL at 21:30

## 2025-04-09 RX ADMIN — HEPARIN SODIUM 5000 UNIT(S): 1000 INJECTION INTRAVENOUS; SUBCUTANEOUS at 06:34

## 2025-04-09 RX ADMIN — POLYETHYLENE GLYCOL-3350 AND ELECTROLYTES 4000 MILLILITER(S): 236; 6.74; 5.86; 2.97; 22.74 POWDER, FOR SOLUTION ORAL at 09:50

## 2025-04-09 RX ADMIN — Medication 500 MILLIGRAM(S): at 21:29

## 2025-04-09 RX ADMIN — HEPARIN SODIUM 5000 UNIT(S): 1000 INJECTION INTRAVENOUS; SUBCUTANEOUS at 13:21

## 2025-04-09 RX ADMIN — NEOMYCIN SULFATE 1000 MILLIGRAM(S): 87.5 SOLUTION ORAL at 14:14

## 2025-04-09 RX ADMIN — NEOMYCIN SULFATE 1000 MILLIGRAM(S): 87.5 SOLUTION ORAL at 21:30

## 2025-04-09 RX ADMIN — Medication 500 MILLIGRAM(S): at 14:14

## 2025-04-09 RX ADMIN — HEPARIN SODIUM 5000 UNIT(S): 1000 INJECTION INTRAVENOUS; SUBCUTANEOUS at 21:30

## 2025-04-09 RX ADMIN — Medication 10 MILLIGRAM(S): at 10:29

## 2025-04-09 RX ADMIN — Medication 40 MILLIGRAM(S): at 06:34

## 2025-04-09 RX ADMIN — TAMSULOSIN HYDROCHLORIDE 0.8 MILLIGRAM(S): 0.4 CAPSULE ORAL at 21:29

## 2025-04-09 RX ADMIN — Medication 500 MILLIGRAM(S): at 17:15

## 2025-04-09 RX ADMIN — Medication 10 MILLIGRAM(S): at 16:12

## 2025-04-09 RX ADMIN — Medication 20 MILLIEQUIVALENT(S): at 07:52

## 2025-04-09 NOTE — PROGRESS NOTE ADULT - SUBJECTIVE AND OBJECTIVE BOX
RAFAT REED , [id number],  [facility] 09UR [id number]    CC : pt is working on bowel prep.     T(C): 36.4 (04-09-25 @ 13:09), Max: 36.7 (04-08-25 @ 20:20)  HR: 66 (04-09-25 @ 13:09) (47 - 66)  BP: 104/68 (04-09-25 @ 13:09) (97/58 - 128/70)  RR: 16 (04-09-25 @ 13:09) (16 - 17)  SpO2: 99% (04-09-25 @ 13:09) (97% - 100%)    acetaminophen   IVPB .. 750 milliGRAM(s) IV Intermittent every 6 hours PRN  bisacodyl 10 milliGRAM(s) Oral <User Schedule>  heparin   Injectable 5000 Unit(s) SubCutaneous every 8 hours  lactated ringers. 1000 milliLiter(s) IV Continuous <Continuous>  metroNIDAZOLE    Tablet 500 milliGRAM(s) Oral <User Schedule>  neomycin 1000 milliGRAM(s) Oral <User Schedule>  ondansetron Injectable 4 milliGRAM(s) IV Push every 6 hours PRN  pantoprazole    Tablet 40 milliGRAM(s) Oral before breakfast  rosuvastatin 10 milliGRAM(s) Oral at bedtime  tamsulosin 0.8 milliGRAM(s) Oral at bedtime      Physical Exam :    General exam :  well built, not in distress, saturating well on room air.   :  no marrero.      CBC Full  -  ( 09 Apr 2025 05:30 )  WBC Count : 4.57 K/uL  RBC Count : 4.11 M/uL  Hemoglobin : 12.5 g/dL  Hematocrit : 37.4 %  Platelet Count - Automated : 161 K/uL  Mean Cell Volume : 91.0 fl  Mean Cell Hemoglobin : 30.4 pg  Mean Cell Hemoglobin Concentration : 33.4 g/dL  Auto Neutrophil # : x  Auto Lymphocyte # : x  Auto Monocyte # : x  Auto Eosinophil # : x  Auto Basophil # : x  Auto Neutrophil % : x  Auto Lymphocyte % : x  Auto Monocyte % : x  Auto Eosinophil % : x  Auto Basophil % : x        04-09    141  |  108  |  13  ----------------------------<  76  3.8   |  23  |  1.03    Ca    8.5      09 Apr 2025 05:30  Phos  3.4     04-09  Mg     2.3     04-09    TPro  6.5  /  Alb  4.1  /  TBili  0.3  /  DBili  x   /  AST  19  /  ALT  16  /  AlkPhos  61  04-08    Daily     Daily   CAPILLARY BLOOD GLUCOSE          Urinalysis with Rflx Culture (collected 08 Apr 2025 10:47)      Urinalysis Basic - ( 09 Apr 2025 05:30 )    Color: x / Appearance: x / SG: x / pH: x  Gluc: 76 mg/dL / Ketone: x  / Bili: x / Urobili: x   Blood: x / Protein: x / Nitrite: x   Leuk Esterase: x / RBC: x / WBC x   Sq Epi: x / Non Sq Epi: x / Bacteria: x        PT/INR - ( 08 Apr 2025 10:47 )   PT: 10.2 sec;   INR: 0.89          PTT - ( 08 Apr 2025 10:47 )  PTT:33.9 sec

## 2025-04-09 NOTE — ADVANCED PRACTICE NURSE CONSULT - ASSESSMENT
Cantonese-speaking patient to have surgery for ileostomy creation. Using Cantonese  and stoma model, pt instructed on what a stoma looks like, how the appliance fits over the stoma, how it is emptied. Pt's abdomen also assessed and marked on right mid abdomen for stoma placement.

## 2025-04-09 NOTE — PROGRESS NOTE ADULT - ASSESSMENT
75yo Cantonese-speaking Male pt with PMH of Gastric ulcers, incompetent ileocecal valve, bowel obstruction (2021) and PSHx of Exlap with Mike patch repair (1980s) and gastrectomy (1980) that is a patient of Dr. De Luna being treated for chronic constipation associated with increased bloating which has failed multiple medical interventions. Repeat CT imaging consisted with severely dilated colon unchanged from CT scan last year. Patient admitted to [facility] surgery for further management of colonic dysmotility.    CLD  Pain and nausea prn  Home meds as appropriate  Miralax and dulcolax  SQH/SCD/OOBA  AM Labs 73yo Cantonese-speaking Male pt with PMH of Gastric ulcers, incompetent ileocecal valve, bowel obstruction (2021) and PSHx of Exlap with Mike patch repair (1980s) and gastrectomy (1980) that is a patient of Dr. De Luna being treated for chronic constipation associated with increased bloating which has failed multiple medical interventions. Repeat CT imaging consisted with severely dilated colon unchanged from CT scan last year. Patient admitted to [facility] surgery for further management of colonic dysmotility.    Abx/mechanical bowel prep today   CLD  Pain and nausea prn  Home meds as appropriate  Miralax and dulcolax  SQH/SCD/OOBA  AM Labs

## 2025-04-09 NOTE — PROGRESS NOTE ADULT - ASSESSMENT
73yo Cantonese/Mandrain-speaking Male pt with PMH of Gastric ulcers, incompetent ileocecal valve, bowel obstruction (2021) and PSHx of Exlap with Mike patch repair (1980s) and gastrectomy (1980) that is a patient of Dr. De Luna being treated for chronic constipation associated with increased bloating. Patient has been seen by many outpatient GI doctors and has failed trials of miralax, enemas, etc. Patient last saw Dr. De Luna on 3/19 and discussed potential total colectomy with possible ileosigmoid anastomosis with DLI to relieve chronic constipation 2/2 delayed transit and was scheduled for surgery 4/10. 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    - bowel prep as per Surgery.     - hx of BPH taking meds- did not recall the name  would give Tamsulosin 0.4 mg po q daily    reported HLD and stating medication at home.   no prior cardiac history ,   baseline good functional status reported.   EKG -4/8- reviewed:  NSR at 50 bpm,  preop risk -   RCRI - 1 point , 6 % cardiac mortality risk.  no further cardiac testing recommended.    pt is medically optimized for anticipated surgery.   pcp is Fausto Gonzalez.     Thank you for allowing medicine to participate in the care.

## 2025-04-09 NOTE — PROGRESS NOTE ADULT - SUBJECTIVE AND OBJECTIVE BOX
SUBJECTIVE: Patient seen and examined bedside by chief resident on AM rounds. Pt states he feels well and has no acute complaints at this time. Tolerating bowel prep and continues to have bowel movements. Denies any acute abdominal pain, nausea or vomiting.     heparin   Injectable 5000 Unit(s) SubCutaneous every 8 hours    MEDICATIONS  (PRN):  acetaminophen   IVPB .. 750 milliGRAM(s) IV Intermittent every 6 hours PRN Mild Pain (1 - 3), Moderate Pain (4 - 6)  ondansetron Injectable 4 milliGRAM(s) IV Push every 6 hours PRN Nausea and/or Vomiting      I&O's Detail      Vital Signs Last 24 Hrs  T(C): 36.4 (09 Apr 2025 04:44), Max: 36.7 (08 Apr 2025 20:20)  T(F): 97.5 (09 Apr 2025 04:44), Max: 98 (08 Apr 2025 20:20)  HR: 47 (09 Apr 2025 04:44) (47 - 65)  BP: 97/58 (09 Apr 2025 04:44) (97/58 - 128/70)  BP(mean): --  RR: 17 (09 Apr 2025 04:44) (16 - 18)  SpO2: 97% (09 Apr 2025 04:44) (97% - 99%)    Parameters below as of 09 Apr 2025 04:44  Patient On (Oxygen Delivery Method): room air        General: NAD, resting comfortably in bed  Pulm: Nonlabored breathing, no respiratory distress  Abd: soft, NT/ND  Extrem: WWP, no edema, SCDs in place    LABS:                        12.5   4.57  )-----------( 161      ( 09 Apr 2025 05:30 )             37.4     04-08    142  |  104  |  17  ----------------------------<  125[H]  3.8   |  26  |  1.12    Ca    8.6      08 Apr 2025 10:47    TPro  6.5  /  Alb  4.1  /  TBili  0.3  /  DBili  x   /  AST  19  /  ALT  16  /  AlkPhos  61  04-08    PT/INR - ( 08 Apr 2025 10:47 )   PT: 10.2 sec;   INR: 0.89          PTT - ( 08 Apr 2025 10:47 )  PTT:33.9 sec  Urinalysis Basic - ( 08 Apr 2025 10:47 )    Color: Yellow / Appearance: Clear / SG: >1.030 / pH: x  Gluc: 125 mg/dL / Ketone: Trace mg/dL  / Bili: Negative / Urobili: 1.0 mg/dL   Blood: x / Protein: Negative mg/dL / Nitrite: Negative   Leuk Esterase: Negative / RBC: x / WBC x   Sq Epi: x / Non Sq Epi: x / Bacteria: x        RADIOLOGY & ADDITIONAL STUDIES:  CT Abdomen and Pelvis w/ Oral Cont and w/ IV Cont:   ACC: [id number] EXAM:  CT ABDOMEN AND PELVIS OC IC   ORDERED BY: SHANTANU BARNEY     PROCEDURE DATE:  04/08/2025          INTERPRETATION:  CLINICAL INFORMATION: Lower abdominal pain.    COMPARISON: Outside CT abdomen and pelvis and MRI May 1, 2024,    CONTRAST/COMPLICATIONS:  IV Contrast: Isovue 370  90 cc administered   10 cc discarded  Oral Contrast: Omnipaque 350  .    PROCEDURE:  CT of the Abdomen and Pelvis was performed.  Sagittal and coronal reformats were performed.    FINDINGS:  LOWER CHEST: Linear scar at right lung base. Right posterior basilar   subpleural micronodule.    LIVER: Within normal limits.  BILE DUCTS: Unchanged mild intra hepatic biliary ductal dilatation,   similar to prior. No significant extrahepatic duct dilatation.  GALLBLADDER: Within normal limits.  SPLEEN: Within normal limits.  PANCREAS: Within normal limits.  ADRENALS: Within normal limits.  KIDNEYS/URETERS: Within normal limits.    BLADDER: Within normal limits.  REPRODUCTIVE ORGANS: Prostate withinnormal limits.    BOWEL: Air and liquid-filled markedly dilated transverse colon up to 14.4   cm with marked smooth luminal narrowing/transition point at the level of   the splenic flexure, similar to prior exams. No mass. Small air and fecal   matter is present distally in nondilated large bowel and rectosigmoid.   Right colon is air-filled dilated measuring up to 8.4 cm.  Appendix is   normal. No pneumatosis or significant wall thickening. Distal small bowel   loops are filled with fecal matterdue to delayed transit or incompetent   sphincter. Terminal ileum is collapsed.  PERITONEUM/RETROPERITONEUM: Small pelvic free fluid.  VESSELS: Atherosclerotic changes.  LYMPH NODES: No lymphadenopathy.  ABDOMINAL WALL: Within normal limits.  BONES: Degenerative changes.    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   No evidence of ischemia.    --- End of Report ---          SVEN BARRERA MD; Resident Radiologist  This document has been electronically signed.  EMERSON MARTÍNEZ MD; Attending Radiologist  This document has been electronically signed. Apr 8 2025  3:29PM (04-08-25 @ 14:01)      Urinalysis with Rflx Culture (collected 04-08-25 @ 10:47)

## 2025-04-10 ENCOUNTER — APPOINTMENT (OUTPATIENT)
Dept: COLORECTAL SURGERY | Facility: HOSPITAL | Age: 75
End: 2025-04-10

## 2025-04-10 ENCOUNTER — TRANSCRIPTION ENCOUNTER (OUTPATIENT)
Age: 75
End: 2025-04-10

## 2025-04-10 LAB
ANION GAP SERPL CALC-SCNC: 11 MMOL/L — SIGNIFICANT CHANGE UP (ref 5–17)
APTT BLD: 38.7 SEC — HIGH (ref 24.5–35.6)
BLD GP AB SCN SERPL QL: NEGATIVE — SIGNIFICANT CHANGE UP
BUN SERPL-MCNC: 12 MG/DL — SIGNIFICANT CHANGE UP (ref 7–23)
CALCIUM SERPL-MCNC: 8.3 MG/DL — LOW (ref 8.4–10.5)
CHLORIDE SERPL-SCNC: 110 MMOL/L — HIGH (ref 96–108)
CO2 SERPL-SCNC: 24 MMOL/L — SIGNIFICANT CHANGE UP (ref 22–31)
CREAT SERPL-MCNC: 1.11 MG/DL — SIGNIFICANT CHANGE UP (ref 0.5–1.3)
EGFR: 70 ML/MIN/1.73M2 — SIGNIFICANT CHANGE UP
EGFR: 70 ML/MIN/1.73M2 — SIGNIFICANT CHANGE UP
GLUCOSE SERPL-MCNC: 78 MG/DL — SIGNIFICANT CHANGE UP (ref 70–99)
HCT VFR BLD CALC: 34.7 % — LOW (ref 39–50)
HGB BLD-MCNC: 11.7 G/DL — LOW (ref 13–17)
INR BLD: 1.02 — SIGNIFICANT CHANGE UP (ref 0.85–1.16)
MAGNESIUM SERPL-MCNC: 2.3 MG/DL — SIGNIFICANT CHANGE UP (ref 1.6–2.6)
MCHC RBC-ENTMCNC: 30.2 PG — SIGNIFICANT CHANGE UP (ref 27–34)
MCHC RBC-ENTMCNC: 33.7 G/DL — SIGNIFICANT CHANGE UP (ref 32–36)
MCV RBC AUTO: 89.7 FL — SIGNIFICANT CHANGE UP (ref 80–100)
NRBC BLD AUTO-RTO: 0 /100 WBCS — SIGNIFICANT CHANGE UP (ref 0–0)
PHOSPHATE SERPL-MCNC: 2.4 MG/DL — LOW (ref 2.5–4.5)
PLATELET # BLD AUTO: 144 K/UL — LOW (ref 150–400)
POTASSIUM SERPL-MCNC: 3.7 MMOL/L — SIGNIFICANT CHANGE UP (ref 3.5–5.3)
POTASSIUM SERPL-SCNC: 3.7 MMOL/L — SIGNIFICANT CHANGE UP (ref 3.5–5.3)
PROTHROM AB SERPL-ACNC: 11.9 SEC — SIGNIFICANT CHANGE UP (ref 9.9–13.4)
RBC # BLD: 3.87 M/UL — LOW (ref 4.2–5.8)
RBC # FLD: 14.2 % — SIGNIFICANT CHANGE UP (ref 10.3–14.5)
RH IG SCN BLD-IMP: POSITIVE — SIGNIFICANT CHANGE UP
SODIUM SERPL-SCNC: 145 MMOL/L — SIGNIFICANT CHANGE UP (ref 135–145)
WBC # BLD: 4.22 K/UL — SIGNIFICANT CHANGE UP (ref 3.8–10.5)
WBC # FLD AUTO: 4.22 K/UL — SIGNIFICANT CHANGE UP (ref 3.8–10.5)

## 2025-04-10 PROCEDURE — 99232 SBSQ HOSP IP/OBS MODERATE 35: CPT

## 2025-04-10 PROCEDURE — 88307 TISSUE EXAM BY PATHOLOGIST: CPT | Mod: 26

## 2025-04-10 PROCEDURE — 44150 REMOVAL OF COLON: CPT | Mod: GC

## 2025-04-10 RX ORDER — FENTANYL CITRATE-0.9 % NACL/PF 100MCG/2ML
25 SYRINGE (ML) INTRAVENOUS
Refills: 0 | Status: DISCONTINUED | OUTPATIENT
Start: 2025-04-10 | End: 2025-04-10

## 2025-04-10 RX ORDER — ONDANSETRON HCL/PF 4 MG/2 ML
4 VIAL (ML) INJECTION EVERY 6 HOURS
Refills: 0 | Status: DISCONTINUED | OUTPATIENT
Start: 2025-04-10 | End: 2025-04-13

## 2025-04-10 RX ORDER — ACETAMINOPHEN 500 MG/5ML
1000 LIQUID (ML) ORAL EVERY 6 HOURS
Refills: 0 | Status: DISCONTINUED | OUTPATIENT
Start: 2025-04-10 | End: 2025-04-13

## 2025-04-10 RX ORDER — KETOROLAC TROMETHAMINE 30 MG/ML
15 INJECTION, SOLUTION INTRAMUSCULAR; INTRAVENOUS EVERY 6 HOURS
Refills: 0 | Status: DISCONTINUED | OUTPATIENT
Start: 2025-04-10 | End: 2025-04-13

## 2025-04-10 RX ORDER — HEPARIN SODIUM 1000 [USP'U]/ML
5000 INJECTION INTRAVENOUS; SUBCUTANEOUS EVERY 12 HOURS
Refills: 0 | Status: DISCONTINUED | OUTPATIENT
Start: 2025-04-10 | End: 2025-04-13

## 2025-04-10 RX ORDER — SODIUM CHLORIDE 9 G/1000ML
1000 INJECTION, SOLUTION INTRAVENOUS
Refills: 0 | Status: DISCONTINUED | OUTPATIENT
Start: 2025-04-10 | End: 2025-04-13

## 2025-04-10 RX ORDER — TAMSULOSIN HYDROCHLORIDE 0.4 MG/1
0.8 CAPSULE ORAL AT BEDTIME
Refills: 0 | Status: DISCONTINUED | OUTPATIENT
Start: 2025-04-10 | End: 2025-04-13

## 2025-04-10 RX ORDER — HYDROMORPHONE/SOD CHLOR,ISO/PF 2 MG/10 ML
0.5 SYRINGE (ML) INJECTION EVERY 4 HOURS
Refills: 0 | Status: DISCONTINUED | OUTPATIENT
Start: 2025-04-10 | End: 2025-04-13

## 2025-04-10 RX ORDER — SODIUM CHLORIDE 9 G/1000ML
1000 INJECTION, SOLUTION INTRAVENOUS
Refills: 0 | Status: DISCONTINUED | OUTPATIENT
Start: 2025-04-10 | End: 2025-04-10

## 2025-04-10 RX ORDER — POTASSIUM PHOSPHATE, MONOBASIC POTASSIUM PHOSPHATE, DIBASIC INJECTION, 236; 224 MG/ML; MG/ML
15 SOLUTION, CONCENTRATE INTRAVENOUS ONCE
Refills: 0 | Status: COMPLETED | OUTPATIENT
Start: 2025-04-10 | End: 2025-04-10

## 2025-04-10 RX ORDER — BUPIVACAINE 13.3 MG/ML
20 INJECTION, SUSPENSION, LIPOSOMAL INFILTRATION ONCE
Refills: 0 | Status: DISCONTINUED | OUTPATIENT
Start: 2025-04-10 | End: 2025-04-10

## 2025-04-10 RX ORDER — METOCLOPRAMIDE HCL 10 MG
10 TABLET ORAL ONCE
Refills: 0 | Status: DISCONTINUED | OUTPATIENT
Start: 2025-04-10 | End: 2025-04-10

## 2025-04-10 RX ORDER — HYDROMORPHONE/SOD CHLOR,ISO/PF 2 MG/10 ML
0.5 SYRINGE (ML) INJECTION
Refills: 0 | Status: DISCONTINUED | OUTPATIENT
Start: 2025-04-10 | End: 2025-04-10

## 2025-04-10 RX ORDER — ROSUVASTATIN CALCIUM 5 MG/1
10 TABLET, FILM COATED ORAL AT BEDTIME
Refills: 0 | Status: DISCONTINUED | OUTPATIENT
Start: 2025-04-10 | End: 2025-04-13

## 2025-04-10 RX ORDER — ACETAMINOPHEN 500 MG/5ML
1000 LIQUID (ML) ORAL ONCE
Refills: 0 | Status: COMPLETED | OUTPATIENT
Start: 2025-04-10 | End: 2025-04-10

## 2025-04-10 RX ADMIN — Medication 1000 MILLIGRAM(S): at 10:28

## 2025-04-10 RX ADMIN — TAMSULOSIN HYDROCHLORIDE 0.8 MILLIGRAM(S): 0.4 CAPSULE ORAL at 21:57

## 2025-04-10 RX ADMIN — ROSUVASTATIN CALCIUM 10 MILLIGRAM(S): 5 TABLET, FILM COATED ORAL at 21:58

## 2025-04-10 RX ADMIN — Medication 40 MILLIGRAM(S): at 06:33

## 2025-04-10 RX ADMIN — SODIUM CHLORIDE 90 MILLILITER(S): 9 INJECTION, SOLUTION INTRAVENOUS at 00:00

## 2025-04-10 RX ADMIN — HEPARIN SODIUM 5000 UNIT(S): 1000 INJECTION INTRAVENOUS; SUBCUTANEOUS at 06:33

## 2025-04-10 RX ADMIN — Medication 1000 MILLIGRAM(S): at 21:57

## 2025-04-10 RX ADMIN — Medication 25 MICROGRAM(S): at 17:17

## 2025-04-10 RX ADMIN — SODIUM CHLORIDE 40 MILLILITER(S): 9 INJECTION, SOLUTION INTRAVENOUS at 19:05

## 2025-04-10 RX ADMIN — KETOROLAC TROMETHAMINE 15 MILLIGRAM(S): 30 INJECTION, SOLUTION INTRAMUSCULAR; INTRAVENOUS at 21:57

## 2025-04-10 RX ADMIN — HEPARIN SODIUM 5000 UNIT(S): 1000 INJECTION INTRAVENOUS; SUBCUTANEOUS at 21:58

## 2025-04-10 RX ADMIN — Medication 25 MICROGRAM(S): at 17:47

## 2025-04-10 NOTE — BRIEF OPERATIVE NOTE - OPERATION/FINDINGS
Additional Notes: Patient has not received 21-22 flu vaccine
Quality 110: Preventive Care And Screening: Influenza Immunization: Influenza Immunization not Administered for Documented Reasons.
Detail Level: Detailed
Midline laparotomy revealing massive dilation of right and transverse colon with acute transition point. Amrit wound retractor. Patient found to have prior B2 reconstruction and distal gastrectomy with associated adhesions. Transverse colon seen passing posterior to jejunal loop. Mobilized hepatic flexure and right colon. Mobilized transverse colon. Took ileocolic pedicle and right colon mesentery with ligasure impact. Ligated transverse mesentery with ligasure. Mobilized descending colon lateral to medial. Took down splenic flexure. Took down adhesions between jejunal mesentery and transverse colon. Divided descending mesentery with ligasure once fully mobilized with care to protect ureter. Transected descending colon with 60 purple endoGIA. Transected terminal ileum with 75 blue MEERA linear cutting stapler. Removed colon specimen by sliding bowel underneath overlying jejunum. Closed defect where transverse previously passed with 3-0 vicryl through peritoneum. Small bowel placed such that SMA lies straight. Aligned terminal ileum with sigmoid antiperistaltic and created side-to-side handsewn anastomosis in two layers with outer interrupted 3-0 silk and inner running 3-0 vicryl. Anastomosis largely patent. Closing tray utilized, fascia running looped 0 PDS.

## 2025-04-10 NOTE — PROGRESS NOTE ADULT - SUBJECTIVE AND OBJECTIVE BOX
INTERVAL HPI/OVERNIGHT EVENTS: pt received abx/dulcolax    SUBJECTIVE: Patient seen and examined bedside with chief resident on AM rounds. He is prepped and ready for the OR today and has no complaints at this time. He is having clear liquid bowel movements. Denies cp, sob, nausea, emesis, or fevers.    MEDICATIONS  (STANDING):  heparin   Injectable 5000 Unit(s) SubCutaneous every 8 hours  lactated ringers. 1000 milliLiter(s) (90 mL/Hr) IV Continuous <Continuous>  pantoprazole  Injectable 40 milliGRAM(s) IV Push daily  rosuvastatin 10 milliGRAM(s) Oral at bedtime  tamsulosin 0.8 milliGRAM(s) Oral at bedtime    MEDICATIONS  (PRN):  acetaminophen   IVPB .. 750 milliGRAM(s) IV Intermittent every 6 hours PRN Mild Pain (1 - 3), Moderate Pain (4 - 6)  ondansetron Injectable 4 milliGRAM(s) IV Push every 6 hours PRN Nausea and/or Vomiting      Vital Signs Last 24 Hrs  T(C): 36.4 (10 Apr 2025 04:02), Max: 36.6 (09 Apr 2025 20:24)  T(F): 97.5 (10 Apr 2025 04:02), Max: 97.8 (09 Apr 2025 20:24)  HR: 50 (10 Apr 2025 04:02) (49 - 66)  BP: 101/52 (10 Apr 2025 04:02) (92/55 - 112/67)  BP(mean): 68 (10 Apr 2025 04:02) (68 - 68)  RR: 18 (10 Apr 2025 04:02) (16 - 18)  SpO2: 96% (10 Apr 2025 04:02) (96% - 100%)    Parameters below as of 10 Apr 2025 04:02  Patient On (Oxygen Delivery Method): room air        PHYSICAL EXAM:  Constitutional: A&Ox3, resting comfortably  Respiratory: non labored breathing, no respiratory distress  Cardiovascular: NSR, RRR  Gastrointestinal: abdomen soft, NTND.  Genitourinary: voiding  Extremities: wwp, no calf tenderness or edema, +SCDs        I&O's Detail    09 Apr 2025 07:01  -  10 Apr 2025 07:00  --------------------------------------------------------  IN:    Lactated Ringers: 450 mL  Total IN: 450 mL    OUT:  Total OUT: 0 mL    Total NET: 450 mL          LABS:                        11.7   4.22  )-----------( 144      ( 10 Apr 2025 05:30 )             34.7     04-10    145  |  110[H]  |  12  ----------------------------<  78  3.7   |  24  |  1.11    Ca    8.3[L]      10 Apr 2025 05:30  Phos  2.4     04-10  Mg     2.3     04-10    TPro  6.5  /  Alb  4.1  /  TBili  0.3  /  DBili  x   /  AST  19  /  ALT  16  /  AlkPhos  61  04-08    PT/INR - ( 10 Apr 2025 05:30 )   PT: 11.9 sec;   INR: 1.02          PTT - ( 10 Apr 2025 05:30 )  PTT:38.7 sec  Urinalysis Basic - ( 10 Apr 2025 05:30 )    Color: x / Appearance: x / SG: x / pH: x  Gluc: 78 mg/dL / Ketone: x  / Bili: x / Urobili: x   Blood: x / Protein: x / Nitrite: x   Leuk Esterase: x / RBC: x / WBC x   Sq Epi: x / Non Sq Epi: x / Bacteria: x        RADIOLOGY & ADDITIONAL STUDIES:

## 2025-04-10 NOTE — BRIEF OPERATIVE NOTE - NSICDXBRIEFPROCEDURE_GEN_ALL_CORE_FT
PROCEDURES:  Open colectomy involving right side of colon 10-Apr-2025 16:38:38 TI, right, transverse, descending Janay Soria

## 2025-04-10 NOTE — PROGRESS NOTE ADULT - ASSESSMENT
73yo Cantonese/Mandrain-speaking Male pt with PMH of Gastric ulcers, incompetent ileocecal valve, bowel obstruction (2021) and PSHx of Exlap with Mike patch repair (1980s) and gastrectomy (1980) that is a patient of Dr. De Luna being treated for chronic constipation associated with increased bloating. Patient has been seen by many outpatient GI doctors and has failed trials of miralax, enemas, etc. Patient last saw Dr. De Luna on 3/19 and discussed potential total colectomy with possible ileosigmoid anastomosis with DLI to relieve chronic constipation 2/2 delayed transit and was scheduled for surgery 4/10. 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    - bowel prep as per Surgery. - npo for OR today 4/10-    - hx of BPH taking meds- did not recall the name  would give Tamsulosin 0.4 mg po q daily    reported HLD and stating medication at home.   no prior cardiac history ,   baseline good functional status reported.   EKG -4/8- reviewed:  NSR at 50 bpm,  preop risk -   RCRI - 1 point , 6 % cardiac mortality risk.  no further cardiac testing recommended.    pt is medically optimized for anticipated surgery.   pcp is Fausto Gonzalez.     Thank you for allowing medicine to participate in the care. dw primary team.

## 2025-04-10 NOTE — PROGRESS NOTE ADULT - SUBJECTIVE AND OBJECTIVE BOX
RAFAT REED , [id number],  [facility] [id number] [id number]    Time of encounter : 11 am  lying in bed, npo for procedure  he told me he has marking for colostomy - and aware about that  he prefer to take breath over using Incentive spirometer ( hard for him to use properly       T(C): 36.6 (04-10-25 @ 11:31), Max: 36.6 (04-09-25 @ 20:24)  HR: 53 (04-10-25 @ 11:31) (49 - 54)  BP: 102/60 (04-10-25 @ 11:31) (92/55 - 119/71)  RR: 17 (04-10-25 @ 11:31) (16 - 17)  SpO2: 100% (04-10-25 @ 11:31) (98% - 100%)    BUpivacaine liposome 1.3% Injectable (no eMAR) 20 milliLiter(s) Local Injection once  fentaNYL    Injectable 25 MICROGram(s) IV Push every 5 minutes PRN  HYDROmorphone  Injectable 0.5 milliGRAM(s) IV Push every 10 minutes PRN  metoclopramide Injectable 10 milliGRAM(s) IV Push once PRN      Physical Exam :    General exam :  well built, not in distress, saturating well on room air.  CVS : RRR no murmur, JVP not elevated  Lungs : good air entry bilaterally   Abdomen : BS present, soft, not tender, less distended today,   Extremities: no edema, no tenderness.  Neuro : AAO x 3 non focal.  Skin : warm and dry.   :  no marrero.      CBC Full  -  ( 10 Apr 2025 05:30 )  WBC Count : 4.22 K/uL  RBC Count : 3.87 M/uL  Hemoglobin : 11.7 g/dL  Hematocrit : 34.7 %  Platelet Count - Automated : 144 K/uL  Mean Cell Volume : 89.7 fl  Mean Cell Hemoglobin : 30.2 pg  Mean Cell Hemoglobin Concentration : 33.7 g/dL  Auto Neutrophil # : x  Auto Lymphocyte # : x  Auto Monocyte # : x  Auto Eosinophil # : x  Auto Basophil # : x  Auto Neutrophil % : x  Auto Lymphocyte % : x  Auto Monocyte % : x  Auto Eosinophil % : x  Auto Basophil % : x        04-10    145  |  110[H]  |  12  ----------------------------<  78  3.7   |  24  |  1.11    Ca    8.3[L]      10 Apr 2025 05:30  Phos  2.4     04-10  Mg     2.3     04-10      Daily Height in cm: 162.56 (10 Apr 2025 11:31)    Daily   CAPILLARY BLOOD GLUCOSE          Urinalysis with Rflx Culture (collected 08 Apr 2025 10:47)      Urinalysis Basic - ( 10 Apr 2025 05:30 )    Color: x / Appearance: x / SG: x / pH: x  Gluc: 78 mg/dL / Ketone: x  / Bili: x / Urobili: x   Blood: x / Protein: x / Nitrite: x   Leuk Esterase: x / RBC: x / WBC x   Sq Epi: x / Non Sq Epi: x / Bacteria: x        PT/INR - ( 10 Apr 2025 05:30 )   PT: 11.9 sec;   INR: 1.02          PTT - ( 10 Apr 2025 05:30 )  PTT:38.7 sec

## 2025-04-10 NOTE — PROGRESS NOTE ADULT - ASSESSMENT
75yo Cantonese-speaking Male pt with PMH of Gastric ulcers, incompetent ileocecal valve, bowel obstruction (2021) and PSHx of Exlap with Mike patch repair (1980s) and gastrectomy (1980) that is a patient of Dr. De Luna being treated for chronic constipation associated with increased bloating which has failed multiple medical interventions. Repeat CT imaging consisted with severely dilated colon unchanged from CT scan last year. Patient admitted to [facility] surgery for further management of colonic dysmotility.    OR today  NPO/IVDF  Pain and nausea control prn  Home meds as appropriate  SQH/SCD/OOBA  AM Labs

## 2025-04-10 NOTE — PROGRESS NOTE ADULT - SUBJECTIVE AND OBJECTIVE BOX
Team 5 Surgery Post-Op Note, PCN:     Pre-Op Dx: colonic inertia  Procedure: Open colectomy involving right side of colon      Surgeon: Calixto    Subjective:  pt seen at bedside, spoken to with  #[id number]. feeling alright. pain well controlled. denies nausea/vomiting. complains of some dizziness    Vital Signs Last 24 Hrs  T(C): 36.4 (10 Apr 2025 16:36), Max: 37 (10 Apr 2025 15:06)  T(F): 97.5 (10 Apr 2025 16:36), Max: 98.6 (10 Apr 2025 15:06)  HR: 68 (10 Apr 2025 16:36) (53 - 74)  BP: 121/68 (10 Apr 2025 16:36) (102/60 - 135/63)  BP(mean): 89 (10 Apr 2025 16:36) (68 - 91)  RR: 15 (10 Apr 2025 16:36) (15 - 18)  SpO2: 100% (10 Apr 2025 16:36) (98% - 100%)    Parameters below as of 10 Apr 2025 16:36  Patient On (Oxygen Delivery Method): mask, nonrebreather        Physical Exam:  General: NAD, resting comfortably in bed  Pulmonary: Nonlabored breathing, no respiratory distress  Cardiovascular: NSR  Abdominal: soft, nondistended, appropriate incisional tenderness, incision c/d/i   Extremities: WWP, normal strength  Neuro: A/O x 3, no focal deficits, normal sensation  Pulses: palpable distal pulses      LABS:                        11.7   4.22  )-----------( 144      ( 10 Apr 2025 05:30 )             34.7     04-10    145  |  110[H]  |  12  ----------------------------<  78  3.7   |  24  |  1.11    Ca    8.3[L]      10 Apr 2025 05:30  Phos  2.4     04-10  Mg     2.3     04-10      PT/INR - ( 10 Apr 2025 05:30 )   PT: 11.9 sec;   INR: 1.02          PTT - ( 10 Apr 2025 05:30 )  PTT:38.7 sec  CAPILLARY BLOOD GLUCOSE        Urinalysis Basic - ( 10 Apr 2025 05:30 )    Color: x / Appearance: x / SG: x / pH: x  Gluc: 78 mg/dL / Ketone: x  / Bili: x / Urobili: x   Blood: x / Protein: x / Nitrite: x   Leuk Esterase: x / RBC: x / WBC x   Sq Epi: x / Non Sq Epi: x / Bacteria: x        ABO Interpretation: B (04-10 @ 07:22)        Radiology and Additional Studies:    Assessment:74y Male s/p above procedure    Plan:  Pain/nausea control PRN  Home meds  Incentive spirometer/OOB/Ambulate  IVF, Diet: CLD  AM labs  [place]

## 2025-04-10 NOTE — PRE-ANESTHESIA EVALUATION ADULT - NSANTHOSAYNRD_GEN_A_CORE
No. LOR screening performed.  STOP BANG Legend: 0-2 = LOW Risk; 3-4 = INTERMEDIATE Risk; 5-8 = HIGH Risk

## 2025-04-11 PROBLEM — Z87.19 PERSONAL HISTORY OF OTHER DISEASES OF THE DIGESTIVE SYSTEM: Chronic | Status: ACTIVE | Noted: 2025-04-09

## 2025-04-11 PROBLEM — K25.9 GASTRIC ULCER, UNSPECIFIED AS ACUTE OR CHRONIC, WITHOUT HEMORRHAGE OR PERFORATION: Chronic | Status: ACTIVE | Noted: 2025-04-08

## 2025-04-11 PROBLEM — E78.5 HYPERLIPIDEMIA, UNSPECIFIED: Chronic | Status: ACTIVE | Noted: 2025-04-09

## 2025-04-11 PROBLEM — Z87.448 PERSONAL HISTORY OF OTHER DISEASES OF URINARY SYSTEM: Chronic | Status: ACTIVE | Noted: 2025-04-09

## 2025-04-11 LAB
ANION GAP SERPL CALC-SCNC: 9 MMOL/L — SIGNIFICANT CHANGE UP (ref 5–17)
BUN SERPL-MCNC: 11 MG/DL — SIGNIFICANT CHANGE UP (ref 7–23)
CALCIUM SERPL-MCNC: 8.2 MG/DL — LOW (ref 8.4–10.5)
CHLORIDE SERPL-SCNC: 104 MMOL/L — SIGNIFICANT CHANGE UP (ref 96–108)
CO2 SERPL-SCNC: 23 MMOL/L — SIGNIFICANT CHANGE UP (ref 22–31)
CREAT SERPL-MCNC: 1.15 MG/DL — SIGNIFICANT CHANGE UP (ref 0.5–1.3)
EGFR: 67 ML/MIN/1.73M2 — SIGNIFICANT CHANGE UP
EGFR: 67 ML/MIN/1.73M2 — SIGNIFICANT CHANGE UP
GLUCOSE SERPL-MCNC: 84 MG/DL — SIGNIFICANT CHANGE UP (ref 70–99)
HCT VFR BLD CALC: 35.1 % — LOW (ref 39–50)
HGB BLD-MCNC: 12.2 G/DL — LOW (ref 13–17)
MAGNESIUM SERPL-MCNC: 2 MG/DL — SIGNIFICANT CHANGE UP (ref 1.6–2.6)
MCHC RBC-ENTMCNC: 31.4 PG — SIGNIFICANT CHANGE UP (ref 27–34)
MCHC RBC-ENTMCNC: 34.8 G/DL — SIGNIFICANT CHANGE UP (ref 32–36)
MCV RBC AUTO: 90.2 FL — SIGNIFICANT CHANGE UP (ref 80–100)
NRBC BLD AUTO-RTO: 0 /100 WBCS — SIGNIFICANT CHANGE UP (ref 0–0)
PHOSPHATE SERPL-MCNC: 3.8 MG/DL — SIGNIFICANT CHANGE UP (ref 2.5–4.5)
PLATELET # BLD AUTO: 139 K/UL — LOW (ref 150–400)
POTASSIUM SERPL-MCNC: 3.8 MMOL/L — SIGNIFICANT CHANGE UP (ref 3.5–5.3)
POTASSIUM SERPL-SCNC: 3.8 MMOL/L — SIGNIFICANT CHANGE UP (ref 3.5–5.3)
RBC # BLD: 3.89 M/UL — LOW (ref 4.2–5.8)
RBC # FLD: 14 % — SIGNIFICANT CHANGE UP (ref 10.3–14.5)
SODIUM SERPL-SCNC: 136 MMOL/L — SIGNIFICANT CHANGE UP (ref 135–145)
WBC # BLD: 10.95 K/UL — HIGH (ref 3.8–10.5)
WBC # FLD AUTO: 10.95 K/UL — HIGH (ref 3.8–10.5)

## 2025-04-11 PROCEDURE — 99232 SBSQ HOSP IP/OBS MODERATE 35: CPT

## 2025-04-11 RX ADMIN — SODIUM CHLORIDE 40 MILLILITER(S): 9 INJECTION, SOLUTION INTRAVENOUS at 06:24

## 2025-04-11 RX ADMIN — KETOROLAC TROMETHAMINE 15 MILLIGRAM(S): 30 INJECTION, SOLUTION INTRAMUSCULAR; INTRAVENOUS at 12:48

## 2025-04-11 RX ADMIN — Medication 1000 MILLIGRAM(S): at 07:32

## 2025-04-11 RX ADMIN — KETOROLAC TROMETHAMINE 15 MILLIGRAM(S): 30 INJECTION, SOLUTION INTRAMUSCULAR; INTRAVENOUS at 07:33

## 2025-04-11 RX ADMIN — HEPARIN SODIUM 5000 UNIT(S): 1000 INJECTION INTRAVENOUS; SUBCUTANEOUS at 22:07

## 2025-04-11 RX ADMIN — KETOROLAC TROMETHAMINE 15 MILLIGRAM(S): 30 INJECTION, SOLUTION INTRAMUSCULAR; INTRAVENOUS at 18:09

## 2025-04-11 RX ADMIN — ROSUVASTATIN CALCIUM 10 MILLIGRAM(S): 5 TABLET, FILM COATED ORAL at 22:07

## 2025-04-11 RX ADMIN — Medication 40 MILLIGRAM(S): at 06:26

## 2025-04-11 RX ADMIN — Medication 1000 MILLIGRAM(S): at 12:48

## 2025-04-11 RX ADMIN — HEPARIN SODIUM 5000 UNIT(S): 1000 INJECTION INTRAVENOUS; SUBCUTANEOUS at 09:42

## 2025-04-11 RX ADMIN — Medication 1000 MILLIGRAM(S): at 18:09

## 2025-04-11 RX ADMIN — TAMSULOSIN HYDROCHLORIDE 0.8 MILLIGRAM(S): 0.4 CAPSULE ORAL at 22:07

## 2025-04-11 NOTE — DIETITIAN INITIAL EVALUATION ADULT - PERSON TAUGHT/METHOD
unable to provide nutrition education at this time as RD unable to speak with pt upon multiple visits today, will provide PRN upon follow up.

## 2025-04-11 NOTE — DIETITIAN NUTRITION RISK NOTIFICATION - ADDITIONAL COMMENTS/DIETITIAN RECOMMENDATIONS
Unable to identify if pt meets criteria for protein-calorie malnutrition at this time, BMI <19 noted.      Recommendations:   1. Continue to advance diet as tolerated from clear liquids to full liquids to low-fiber diet as medically feasible per MD orders   ** If unable to advance diet / pt unable to tolerate PO intake, consider initiating nutrition support, please reconsult RD for nutrition support recommendations PRN   2. As feasible, encourage and monitor PO intake, honor preferences as able   >> As feasible, consistently meet >75% of estimated needs during admission   >> Once diet advanced, consider oral nutrition supplement should intake decline </= 50%   3. Monitor wt trends, GI function, skin integrity  4. Monitor lytes, renal indices, blood glucose, LFTs    5. Pain and bowel regimen per team   6. Consider adding MVI and thiamine for general nutrient coverage if medically feasible per MD orders   RD will continue to monitor PO intake, labs, hydration, and wt prn.

## 2025-04-11 NOTE — PROGRESS NOTE ADULT - SUBJECTIVE AND OBJECTIVE BOX
INTERVAL HPI/OVERNIGHT EVENTS: pain controlled, -n/-v, lópez clears, -f/-bm    STATUS POST: open subtotal colectomy    POST OPERATIVE DAY #: 1    SUBJECTIVE: Pt seen and examined by chief resident. Pt is doing well, resting comfortably on bed. Reports some pain. Has not had much to drink overnight. Still with marrero. -OOBA. -BF. No nausea or vomiting. No complaints at this time.    MEDICATIONS  (STANDING):  acetaminophen     Tablet .. 1000 milliGRAM(s) Oral every 6 hours  heparin   Injectable 5000 Unit(s) SubCutaneous every 12 hours  ketorolac   Injectable 15 milliGRAM(s) IV Push every 6 hours  lactated ringers. 1000 milliLiter(s) (40 mL/Hr) IV Continuous <Continuous>  pantoprazole    Tablet 40 milliGRAM(s) Oral before breakfast  rosuvastatin 10 milliGRAM(s) Oral at bedtime  tamsulosin 0.8 milliGRAM(s) Oral at bedtime    MEDICATIONS  (PRN):  HYDROmorphone  Injectable 0.5 milliGRAM(s) IV Push every 4 hours PRN Severe Pain (7 - 10)  ondansetron Injectable 4 milliGRAM(s) IV Push every 6 hours PRN Nausea and/or Vomiting      Vital Signs Last 24 Hrs  T(C): 36.8 (11 Apr 2025 08:40), Max: 37 (10 Apr 2025 15:06)  T(F): 98.3 (11 Apr 2025 08:40), Max: 98.6 (10 Apr 2025 15:06)  HR: 45 (11 Apr 2025 08:40) (45 - 74)  BP: 120/62 (11 Apr 2025 08:40) (91/47 - 135/63)  BP(mean): 72 (10 Apr 2025 21:02) (72 - 91)  RR: 16 (11 Apr 2025 08:40) (15 - 18)  SpO2: 97% (11 Apr 2025 08:40) (97% - 100%)    Parameters below as of 11 Apr 2025 08:40  Patient On (Oxygen Delivery Method): room air        PHYSICAL EXAM:  General: Patient is doing well and lying in bed comfortably  Constitutional: alert and oriented   Pulm: Nonlabored breathing, no respiratory distress  CV: Regular rate and rhythm, normal sinus rhythm  Abd:  soft, nondistended, mild global tenderness to palpation. No rebound, no guarding.             Incisions: clean, dry, intact and healing well, no erythema/induration/edema  Extremities: warm, well perfused, no edema                  I&O's Detail    10 Apr 2025 07:01  -  11 Apr 2025 07:00  --------------------------------------------------------  IN:    Lactated Ringers: 130 mL    Oral Fluid: 60 mL  Total IN: 190 mL    OUT:    Indwelling Catheter - Urethral (mL): 600 mL  Total OUT: 600 mL    Total NET: -410 mL      11 Apr 2025 07:01  -  11 Apr 2025 12:16  --------------------------------------------------------  IN:    Lactated Ringers: 120 mL    Oral Fluid: 360 mL  Total IN: 480 mL    OUT:    Voided (mL): 250 mL  Total OUT: 250 mL    Total NET: 230 mL          LABS:                        12.2   10.95 )-----------( 139      ( 11 Apr 2025 06:51 )             35.1     04-11    136  |  104  |  11  ----------------------------<  84  3.8   |  23  |  1.15    Ca    8.2[L]      11 Apr 2025 06:51  Phos  3.8     04-11  Mg     2.0     04-11      PT/INR - ( 10 Apr 2025 05:30 )   PT: 11.9 sec;   INR: 1.02          PTT - ( 10 Apr 2025 05:30 )  PTT:38.7 sec  Urinalysis Basic - ( 11 Apr 2025 06:51 )    Color: x / Appearance: x / SG: x / pH: x  Gluc: 84 mg/dL / Ketone: x  / Bili: x / Urobili: x   Blood: x / Protein: x / Nitrite: x   Leuk Esterase: x / RBC: x / WBC x   Sq Epi: x / Non Sq Epi: x / Bacteria: x        RADIOLOGY & ADDITIONAL STUDIES:

## 2025-04-11 NOTE — PROGRESS NOTE ADULT - SUBJECTIVE AND OBJECTIVE BOX
RAFAT REED , [id number],  [facility] [id number]    Time of encounter : 9 :30 am  pt lying in bed,  marrero removed  in the afternoon -seen ambulating in the hallway.   passed gas    T(C): 36.4 (04-11-25 @ 20:17), Max: 36.8 (04-11-25 @ 08:40)  HR: 52 (04-11-25 @ 20:17) (45 - 58)  BP: 104/61 (04-11-25 @ 20:17) (89/58 - 120/62)  RR: 17 (04-11-25 @ 20:17) (16 - 17)  SpO2: 100% (04-11-25 @ 20:17) (97% - 100%)    acetaminophen     Tablet .. 1000 milliGRAM(s) Oral every 6 hours  heparin   Injectable 5000 Unit(s) SubCutaneous every 12 hours  HYDROmorphone  Injectable 0.5 milliGRAM(s) IV Push every 4 hours PRN  ketorolac   Injectable 15 milliGRAM(s) IV Push every 6 hours  lactated ringers. 1000 milliLiter(s) IV Continuous <Continuous>  ondansetron Injectable 4 milliGRAM(s) IV Push every 6 hours PRN  pantoprazole    Tablet 40 milliGRAM(s) Oral before breakfast  rosuvastatin 10 milliGRAM(s) Oral at bedtime  tamsulosin 0.8 milliGRAM(s) Oral at bedtime      Physical Exam :    General exam :  well built, not in distress, saturating well on room air.  CVS : RRR no murmur,  Lungs : good air entry bilaterally   Abdomen : BS present, mid-line laparotomy wound , no colostomy.   Extremities: no edema, no tenderness.  Neuro : AAO x 3 non focal.  Skin : warm and dry.   :  no marrero.      CBC Full  -  ( 11 Apr 2025 06:51 )  WBC Count : 10.95 K/uL  RBC Count : 3.89 M/uL  Hemoglobin : 12.2 g/dL  Hematocrit : 35.1 %  Platelet Count - Automated : 139 K/uL  Mean Cell Volume : 90.2 fl  Mean Cell Hemoglobin : 31.4 pg  Mean Cell Hemoglobin Concentration : 34.8 g/dL  Auto Neutrophil # : x  Auto Lymphocyte # : x  Auto Monocyte # : x  Auto Eosinophil # : x  Auto Basophil # : x  Auto Neutrophil % : x  Auto Lymphocyte % : x  Auto Monocyte % : x  Auto Eosinophil % : x  Auto Basophil % : x        04-11    136  |  104  |  11  ----------------------------<  84  3.8   |  23  |  1.15    Ca    8.2[L]      11 Apr 2025 06:51  Phos  3.8     04-11  Mg     2.0     04-11      Daily     Daily   CAPILLARY BLOOD GLUCOSE          Urinalysis Basic - ( 11 Apr 2025 06:51 )    Color: x / Appearance: x / SG: x / pH: x  Gluc: 84 mg/dL / Ketone: x  / Bili: x / Urobili: x   Blood: x / Protein: x / Nitrite: x   Leuk Esterase: x / RBC: x / WBC x   Sq Epi: x / Non Sq Epi: x / Bacteria: x        PT/INR - ( 10 Apr 2025 05:30 )   PT: 11.9 sec;   INR: 1.02          PTT - ( 10 Apr 2025 05:30 )  PTT:38.7 sec

## 2025-04-11 NOTE — DIETITIAN INITIAL EVALUATION ADULT - NSFNSGIIOFT_GEN_A_CORE
I&O's Detail    10 Apr 2025 07:01  -  11 Apr 2025 07:00  --------------------------------------------------------  IN:    Lactated Ringers: 130 mL    Oral Fluid: 60 mL  Total IN: 190 mL    OUT:    Indwelling Catheter - Urethral (mL): 600 mL  Total OUT: 600 mL    Total NET: -410 mL      11 Apr 2025 07:01  -  11 Apr 2025 13:27  --------------------------------------------------------  IN:    Lactated Ringers: 240 mL    Oral Fluid: 360 mL  Total IN: 600 mL    OUT:    Voided (mL): 250 mL  Total OUT: 250 mL    Total NET: 350 mL

## 2025-04-11 NOTE — PROGRESS NOTE ADULT - ASSESSMENT
75yo Cantonese/Mandrain-speaking Male pt with PMH of Gastric ulcers, incompetent ileocecal valve, bowel obstruction (2021) and PSHx of Exlap with Mike patch repair (1980s) and gastrectomy (1980) that is a patient of Dr. De Luna being treated for chronic constipation associated with increased bloating. Patient has been seen by many outpatient GI doctors and has failed trials of miralax, enemas, etc. Patient last saw Dr. De Luna on 3/19 and discussed potential total colectomy with possible ileosigmoid anastomosis with DLI to relieve chronic constipation 2/2 delayed transit and was scheduled for surgery 4/10. 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    - sp colonic resection, right colon,  4/10- pod # 1  passed gas, ambulating  diet per surgery  await return of bowel     - hx of BPH taking meds- did not recall the name  would give Tamsulosin 0.4 mg po q daily  tov today.    reported HLD and stating medication at home.   no prior cardiac history ,   baseline good functional status reported.   EKG -4/8- reviewed:  NSR at 50 bpm,     pcp is Fausto Gonzalez.     Thank you for allowing medicine to participate in the care. dw primary team.

## 2025-04-11 NOTE — PROGRESS NOTE ADULT - ASSESSMENT
73yo Cantonese-speaking Male pt with PMH of Gastric ulcers, incompetent ileocecal valve, bowel obstruction (2021) and PSHx of Exlap with Mike patch repair (1980s) and gastrectomy (1980) that is a patient of Dr. De Luna being treated for chronic constipation associated with increased bloating which has failed multiple medical interventions. Repeat CT imaging consisted with severely dilated colon unchanged from CT scan last year. Patient admitted to [facility] surgery for further management of colonic dysmotility. He is now s/p a subtotal colectomy (4/10)    CLD/IVDF  Pain and nausea control prn  Home meds as appropriate  SQH/SCD/OOBA  AM Labs

## 2025-04-11 NOTE — DIETITIAN INITIAL EVALUATION ADULT - HEIGHT FOR BMI (FEET)
Detail Level: Detailed Quality 226: Preventive Care And Screening: Tobacco Use: Screening And Cessation Intervention: Patient screened for tobacco use and is an ex/non-smoker Quality 130: Documentation Of Current Medications In The Medical Record: Current Medications Documented Quality 431: Preventive Care And Screening: Unhealthy Alcohol Use - Screening: Patient not identified as an unhealthy alcohol user when screened for unhealthy alcohol use using a systematic screening method 5

## 2025-04-11 NOTE — DIETITIAN INITIAL EVALUATION ADULT - PERTINENT MEDS FT
MEDICATIONS  (STANDING):  acetaminophen     Tablet .. 1000 milliGRAM(s) Oral every 6 hours  heparin   Injectable 5000 Unit(s) SubCutaneous every 12 hours  ketorolac   Injectable 15 milliGRAM(s) IV Push every 6 hours  lactated ringers. 1000 milliLiter(s) (40 mL/Hr) IV Continuous <Continuous>  pantoprazole    Tablet 40 milliGRAM(s) Oral before breakfast  rosuvastatin 10 milliGRAM(s) Oral at bedtime  tamsulosin 0.8 milliGRAM(s) Oral at bedtime    MEDICATIONS  (PRN):  HYDROmorphone  Injectable 0.5 milliGRAM(s) IV Push every 4 hours PRN Severe Pain (7 - 10)  ondansetron Injectable 4 milliGRAM(s) IV Push every 6 hours PRN Nausea and/or Vomiting

## 2025-04-11 NOTE — DIETITIAN INITIAL EVALUATION ADULT - OTHER INFO
"73yo Cantonese-speaking Male pt with PMH of Gastric ulcers, incompetent ileocecal valve, bowel obstruction (2021) and PSHx of Exlap with Mike patch repair (1980s) and gastrectomy (1980) that is a patient of Dr. De Luna being treated for chronic constipation associated with increased bloating. Patient has been seen by many outpatient GI doctors and has failed trials of miralax, enemas, etc. Patient last saw Dr. De Luna on 3/19 and discussed potential total colectomy with possible ileosigmoid anastomosis with DLI to relieve chronic constipation 2/2 delayed transit and was scheduled for surgery 4/10. 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 Distal ileal bowel loops are also dilated" and was called by outpatient office today to come to ED for admission and preop optimization for surgery Thursday."     Patient seen at bedside on 9ur for nutrition assessment, however, RD unable to speak with pt upon multiple visits today - the following obtained via EMR but RD will follow up as soon as possible to obtain further diet / wt hx. Current diet order: clear liquid diet, minimal consumption of liquids overnight as per surgery note. No known food allergies per EMR, pt unable to confirm. Dosing weight: 109 pounds,  pounds, BMI <19. Wt hx per HIE: 109 pounds (7/18/24), 110 pounds (3/19/25); no significant wt changes noted x past 9 months. No pressure injuries documented, carmita 20. No edema documented. No nausea/vomiting/diarrhea documented, chronic hx of constipation noted as per H&P above; + flatus and last bowel movement documented 4/9. Labs: nutritionally pertinent labs WNL. Meds: LR, protonix, zofran PRN. Unable to obtain NFPE at this time and therefore unable to identify if pt meets criteria for malnutrition, will adjust nutrition dx PRN upon follow up. No cultural, ethnic, [employer] food preferences reported. See nutrition recommendations below.

## 2025-04-11 NOTE — DIETITIAN INITIAL EVALUATION ADULT - PERTINENT LABORATORY DATA
04-11    136  |  104  |  11  ----------------------------<  84  3.8   |  23  |  1.15    Ca    8.2[L]      11 Apr 2025 06:51  Phos  3.8     04-11  Mg     2.0     04-11

## 2025-04-11 NOTE — DIETITIAN INITIAL EVALUATION ADULT - ADD RECOMMEND
1. Continue to advance diet as tolerated from clear liquids to full liquids to low-fiber diet as medically feasible per MD orders   ** If unable to advance diet / pt unable to tolerate PO intake, consider initiating nutrition support, please reconsult RD for nutrition support recommendations PRN   2. As feasible, encourage and monitor PO intake, honor preferences as able   >> As feasible, consistently meet >75% of estimated needs during admission   >> Once diet advanced, consider oral nutrition supplement should intake decline </= 50%   3. Monitor wt trends, GI function, skin integrity  4. Monitor lytes, renal indices, blood glucose, LFTs    5. Pain and bowel regimen per team   6. Consider adding MVI and thiamine for general nutrient coverage if medically feasible per MD orders   RD will continue to monitor PO intake, labs, hydration, and wt prn.

## 2025-04-11 NOTE — DIETITIAN INITIAL EVALUATION ADULT - OTHER CALCULATIONS
Based on dosing wt 109 pounds as pt within % IBW (84%). Needs adjusted for post-op healing, likely high risk for malnutrition / low BMI status, advanced age.

## 2025-04-12 LAB
ANION GAP SERPL CALC-SCNC: 6 MMOL/L — SIGNIFICANT CHANGE UP (ref 5–17)
BUN SERPL-MCNC: 10 MG/DL — SIGNIFICANT CHANGE UP (ref 7–23)
CALCIUM SERPL-MCNC: 8.3 MG/DL — LOW (ref 8.4–10.5)
CHLORIDE SERPL-SCNC: 109 MMOL/L — HIGH (ref 96–108)
CO2 SERPL-SCNC: 25 MMOL/L — SIGNIFICANT CHANGE UP (ref 22–31)
CREAT SERPL-MCNC: 1.05 MG/DL — SIGNIFICANT CHANGE UP (ref 0.5–1.3)
EGFR: 75 ML/MIN/1.73M2 — SIGNIFICANT CHANGE UP
EGFR: 75 ML/MIN/1.73M2 — SIGNIFICANT CHANGE UP
GLUCOSE SERPL-MCNC: 80 MG/DL — SIGNIFICANT CHANGE UP (ref 70–99)
HCT VFR BLD CALC: 32.3 % — LOW (ref 39–50)
HGB BLD-MCNC: 11 G/DL — LOW (ref 13–17)
MAGNESIUM SERPL-MCNC: 2 MG/DL — SIGNIFICANT CHANGE UP (ref 1.6–2.6)
MCHC RBC-ENTMCNC: 30.9 PG — SIGNIFICANT CHANGE UP (ref 27–34)
MCHC RBC-ENTMCNC: 34.1 G/DL — SIGNIFICANT CHANGE UP (ref 32–36)
MCV RBC AUTO: 90.7 FL — SIGNIFICANT CHANGE UP (ref 80–100)
NRBC BLD AUTO-RTO: 0 /100 WBCS — SIGNIFICANT CHANGE UP (ref 0–0)
PHOSPHATE SERPL-MCNC: 1.8 MG/DL — LOW (ref 2.5–4.5)
PLATELET # BLD AUTO: 122 K/UL — LOW (ref 150–400)
POTASSIUM SERPL-MCNC: 3.9 MMOL/L — SIGNIFICANT CHANGE UP (ref 3.5–5.3)
POTASSIUM SERPL-SCNC: 3.9 MMOL/L — SIGNIFICANT CHANGE UP (ref 3.5–5.3)
RBC # BLD: 3.56 M/UL — LOW (ref 4.2–5.8)
RBC # FLD: 14.2 % — SIGNIFICANT CHANGE UP (ref 10.3–14.5)
SODIUM SERPL-SCNC: 140 MMOL/L — SIGNIFICANT CHANGE UP (ref 135–145)
WBC # BLD: 7.38 K/UL — SIGNIFICANT CHANGE UP (ref 3.8–10.5)
WBC # FLD AUTO: 7.38 K/UL — SIGNIFICANT CHANGE UP (ref 3.8–10.5)

## 2025-04-12 PROCEDURE — 99232 SBSQ HOSP IP/OBS MODERATE 35: CPT

## 2025-04-12 RX ORDER — POTASSIUM PHOSPHATE, MONOBASIC POTASSIUM PHOSPHATE, DIBASIC INJECTION, 236; 224 MG/ML; MG/ML
21 SOLUTION, CONCENTRATE INTRAVENOUS ONCE
Refills: 0 | Status: COMPLETED | OUTPATIENT
Start: 2025-04-12 | End: 2025-04-12

## 2025-04-12 RX ADMIN — Medication 1000 MILLIGRAM(S): at 12:31

## 2025-04-12 RX ADMIN — Medication 104 MILLIGRAM(S): at 15:40

## 2025-04-12 RX ADMIN — HEPARIN SODIUM 5000 UNIT(S): 1000 INJECTION INTRAVENOUS; SUBCUTANEOUS at 10:56

## 2025-04-12 RX ADMIN — KETOROLAC TROMETHAMINE 15 MILLIGRAM(S): 30 INJECTION, SOLUTION INTRAMUSCULAR; INTRAVENOUS at 18:03

## 2025-04-12 RX ADMIN — KETOROLAC TROMETHAMINE 15 MILLIGRAM(S): 30 INJECTION, SOLUTION INTRAMUSCULAR; INTRAVENOUS at 06:01

## 2025-04-12 RX ADMIN — Medication 1000 MILLIGRAM(S): at 18:15

## 2025-04-12 RX ADMIN — Medication 1000 MILLIGRAM(S): at 11:41

## 2025-04-12 RX ADMIN — Medication 40 MILLIGRAM(S): at 06:02

## 2025-04-12 RX ADMIN — POTASSIUM PHOSPHATE, MONOBASIC POTASSIUM PHOSPHATE, DIBASIC INJECTION, 62.5 MILLIMOLE(S): 236; 224 SOLUTION, CONCENTRATE INTRAVENOUS at 12:40

## 2025-04-12 RX ADMIN — HEPARIN SODIUM 5000 UNIT(S): 1000 INJECTION INTRAVENOUS; SUBCUTANEOUS at 22:09

## 2025-04-12 RX ADMIN — Medication 1000 MILLIGRAM(S): at 06:01

## 2025-04-12 RX ADMIN — TAMSULOSIN HYDROCHLORIDE 0.8 MILLIGRAM(S): 0.4 CAPSULE ORAL at 22:09

## 2025-04-12 RX ADMIN — Medication 1000 MILLIGRAM(S): at 18:03

## 2025-04-12 RX ADMIN — Medication 1000 MILLIGRAM(S): at 23:47

## 2025-04-12 RX ADMIN — KETOROLAC TROMETHAMINE 15 MILLIGRAM(S): 30 INJECTION, SOLUTION INTRAMUSCULAR; INTRAVENOUS at 12:31

## 2025-04-12 RX ADMIN — ROSUVASTATIN CALCIUM 10 MILLIGRAM(S): 5 TABLET, FILM COATED ORAL at 22:09

## 2025-04-12 RX ADMIN — KETOROLAC TROMETHAMINE 15 MILLIGRAM(S): 30 INJECTION, SOLUTION INTRAMUSCULAR; INTRAVENOUS at 11:41

## 2025-04-12 RX ADMIN — KETOROLAC TROMETHAMINE 15 MILLIGRAM(S): 30 INJECTION, SOLUTION INTRAMUSCULAR; INTRAVENOUS at 18:15

## 2025-04-12 RX ADMIN — KETOROLAC TROMETHAMINE 15 MILLIGRAM(S): 30 INJECTION, SOLUTION INTRAMUSCULAR; INTRAVENOUS at 23:39

## 2025-04-12 NOTE — PROGRESS NOTE ADULT - ASSESSMENT
73yo Cantonese-speaking Male pt with PMH of Gastric ulcers, incompetent ileocecal valve, bowel obstruction (2021) and PSHx of Exlap with Mike patch repair (1980s) and gastrectomy (1980) that is a patient of Dr. De Luna being treated for chronic constipation associated with increased bloating which has failed multiple medical interventions. Repeat CT imaging consisted with severely dilated colon unchanged from CT scan last year. Patient admitted to [facility] surgery for further management of colonic dysmotility. He is now s/p a subtotal colectomy (4/10)    LRD/IVDF  Pain and nausea control prn  Home meds as appropriate  SQH/SCD/OOBA  AM Labs

## 2025-04-12 NOTE — PROGRESS NOTE ADULT - ASSESSMENT
75yo Cantonese/Mandrain-speaking Male pt with PMH of Gastric ulcers, incompetent ileocecal valve, bowel obstruction (2021) and PSHx of Exlap with Mike patch repair (1980s) and gastrectomy (1980) that is a patient of Dr. De Luna being treated for chronic constipation associated with increased bloating. Patient has been seen by many outpatient GI doctors and has failed trials of miralax, enemas, etc. Patient last saw Dr. De Luna on 3/19 and discussed potential total colectomy with possible ileosigmoid anastomosis with DLI to relieve chronic constipation 2/2 delayed transit and was scheduled for surgery 4/10. 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    - sp colonic resection, right colon,  4/10- pod # 2  , ambulating  low fiber diet.     - hx of BPH taking meds- did not recall the name  would give Tamsulosin 0.4 mg po q daily  voiding well.     reported HLD and stating medication at home.   no prior cardiac history ,   baseline good functional status reported.   EKG -4/8- reviewed:  NSR at 50 bpm,     pcp is Fausto Gonzalez.     Thank you for allowing medicine to participate in the care. dw primary team.   for discharge- to arrange transportation to home ( lives with elderly wife )

## 2025-04-12 NOTE — PROGRESS NOTE ADULT - ASSESSMENT
A/P  Doing well thus far  Early return of bowel function.  Will advance diet to low residue  Ambulate more and track I/O's  Adding H2 blocker since he c/o epigastric mild discomfort.  Hopefully, d/c by tomorrow.  Follow exam and outputs.

## 2025-04-12 NOTE — PROGRESS NOTE ADULT - SUBJECTIVE AND OBJECTIVE BOX
POD2 s/p subtotal colectomy for poor motility and dilated proximal colon.  s/p prior Billrth 2 reconstruction and gastrectomy for PUD many years ago  9 Uris  (covering for Dr. De Luna)    On clear liquids  Ambulating, OOB standing at time of visit today  Having loose BM's and flatus since op  Pain being managed but does not epigastric discomfort that comes and goes  Voiding well    Vital Signs Last 24 Hrs  T(C): 36.5 (12 Apr 2025 08:18), Max: 36.8 (12 Apr 2025 04:44)  T(F): 97.7 (12 Apr 2025 08:18), Max: 98.2 (12 Apr 2025 04:44)  HR: 57 (12 Apr 2025 08:18) (48 - 57)  BP: 133/73 (12 Apr 2025 08:18) (89/58 - 133/73)  BP(mean): --  RR: 17 (12 Apr 2025 08:18) (16 - 17)  SpO2: 97% (12 Apr 2025 08:18) (96% - 100%)    Parameters below as of 12 Apr 2025 08:18  Patient On (Oxygen Delivery Method): room air    abd: midline incision intact, not distended  ext without calf tenderness       ml/last shift                          11.0   7.38  )-----------( 122      ( 12 Apr 2025 05:30 )             32.3   04-12    140  |  109[H]  |  10  ----------------------------<  80  3.9   |  25  |  1.05    Ca    8.3[L]      12 Apr 2025 05:30  Phos  1.8     04-12  Mg     2.0     04-12

## 2025-04-12 NOTE — PROGRESS NOTE ADULT - SUBJECTIVE AND OBJECTIVE BOX
OVERNIGHT: +flatus x 4/-bm +ooba, -n/-v, pain controlled.    SUBJECTIVE: Pt seen with senior resident. Pain well controlled. Patient denies CP/SOB/N/V. Urinating without issue. Has flatus but no BM. Tolerating diet well. Ambulating without issue.        MEDICATIONS  (STANDING):  acetaminophen     Tablet .. 1000 milliGRAM(s) Oral every 6 hours  famotidine  IVPB 20 milliGRAM(s) IV Intermittent daily  heparin   Injectable 5000 Unit(s) SubCutaneous every 12 hours  ketorolac   Injectable 15 milliGRAM(s) IV Push every 6 hours  lactated ringers. 1000 milliLiter(s) (40 mL/Hr) IV Continuous <Continuous>  pantoprazole    Tablet 40 milliGRAM(s) Oral before breakfast  rosuvastatin 10 milliGRAM(s) Oral at bedtime  tamsulosin 0.8 milliGRAM(s) Oral at bedtime    MEDICATIONS  (PRN):  HYDROmorphone  Injectable 0.5 milliGRAM(s) IV Push every 4 hours PRN Severe Pain (7 - 10)  ondansetron Injectable 4 milliGRAM(s) IV Push every 6 hours PRN Nausea and/or Vomiting      Vital Signs Last 24 Hrs  T(C): 36.5 (12 Apr 2025 08:18), Max: 36.8 (12 Apr 2025 04:44)  T(F): 97.7 (12 Apr 2025 08:18), Max: 98.2 (12 Apr 2025 04:44)  HR: 57 (12 Apr 2025 08:18) (48 - 57)  BP: 133/73 (12 Apr 2025 08:18) (89/58 - 133/73)  BP(mean): --  RR: 17 (12 Apr 2025 08:18) (16 - 17)  SpO2: 97% (12 Apr 2025 08:18) (96% - 100%)    Parameters below as of 12 Apr 2025 08:18  Patient On (Oxygen Delivery Method): room air        Physical Exam:  General: NAD, resting comfortably in bed  Pulmonary: Nonlabored breathing, no respiratory distress  Cardiovascular: NSR  Abdominal: soft, NT/ND. Incisions c/d/i  Extremities: WWP, normal strength  Neuro: A/O x 3, CNs II-XII grossly intact, no focal deficits, normal motor/sensation  Pulses: palpable distal pulses    I&O's Summary    11 Apr 2025 07:01  -  12 Apr 2025 07:00  --------------------------------------------------------  IN: 1320 mL / OUT: 900 mL / NET: 420 mL    12 Apr 2025 07:01  -  12 Apr 2025 12:43  --------------------------------------------------------  IN: 490 mL / OUT: 0 mL / NET: 490 mL        LABS:                        11.0   7.38  )-----------( 122      ( 12 Apr 2025 05:30 )             32.3     04-12    140  |  109[H]  |  10  ----------------------------<  80  3.9   |  25  |  1.05    Ca    8.3[L]      12 Apr 2025 05:30  Phos  1.8     04-12  Mg     2.0     04-12        Urinalysis Basic - ( 12 Apr 2025 05:30 )    Color: x / Appearance: x / SG: x / pH: x  Gluc: 80 mg/dL / Ketone: x  / Bili: x / Urobili: x   Blood: x / Protein: x / Nitrite: x   Leuk Esterase: x / RBC: x / WBC x   Sq Epi: x / Non Sq Epi: x / Bacteria: x      CAPILLARY BLOOD GLUCOSE            RADIOLOGY & ADDITIONAL STUDIES:

## 2025-04-12 NOTE — PROGRESS NOTE ADULT - SUBJECTIVE AND OBJECTIVE BOX
RAFAT REED , [id number],  [facility] [address]UR [id number] 02    Time of encounter : 9:30 am   ambulating   tolerating diet with no distension     T(C): 36.6 (04-12-25 @ 13:32), Max: 36.8 (04-12-25 @ 04:44)  HR: 72 (04-12-25 @ 13:32) (52 - 72)  BP: 141/84 (04-12-25 @ 13:32) (89/58 - 141/84)  RR: 18 (04-12-25 @ 13:32) (17 - 18)  SpO2: 99% (04-12-25 @ 13:32) (96% - 100%)    acetaminophen     Tablet .. 1000 milliGRAM(s) Oral every 6 hours  famotidine  IVPB 20 milliGRAM(s) IV Intermittent daily  heparin   Injectable 5000 Unit(s) SubCutaneous every 12 hours  HYDROmorphone  Injectable 0.5 milliGRAM(s) IV Push every 4 hours PRN  ketorolac   Injectable 15 milliGRAM(s) IV Push every 6 hours  lactated ringers. 1000 milliLiter(s) IV Continuous <Continuous>  ondansetron Injectable 4 milliGRAM(s) IV Push every 6 hours PRN  pantoprazole    Tablet 40 milliGRAM(s) Oral before breakfast  rosuvastatin 10 milliGRAM(s) Oral at bedtime  tamsulosin 0.8 milliGRAM(s) Oral at bedtime      Physical Exam :    General exam :  well built, not in distress, saturating well on room air.  CVS : RRR no murmur, JVP not elevated  Lungs : good air entry bilaterally   Abdomen : BS present, soft, not distended.- midline scar. mild tender  Extremities: no edema, no tenderness.  Neuro : AAO x 3 non focal.  Skin : warm and dry.   :  no marrero.      CBC Full  -  ( 12 Apr 2025 05:30 )  WBC Count : 7.38 K/uL  RBC Count : 3.56 M/uL  Hemoglobin : 11.0 g/dL  Hematocrit : 32.3 %  Platelet Count - Automated : 122 K/uL  Mean Cell Volume : 90.7 fl  Mean Cell Hemoglobin : 30.9 pg  Mean Cell Hemoglobin Concentration : 34.1 g/dL  Auto Neutrophil # : x  Auto Lymphocyte # : x  Auto Monocyte # : x  Auto Eosinophil # : x  Auto Basophil # : x  Auto Neutrophil % : x  Auto Lymphocyte % : x  Auto Monocyte % : x  Auto Eosinophil % : x  Auto Basophil % : x        04-12    140  |  109[H]  |  10  ----------------------------<  80  3.9   |  25  |  1.05    Ca    8.3[L]      12 Apr 2025 05:30  Phos  1.8     04-12  Mg     2.0     04-12      Daily     Daily   CAPILLARY BLOOD GLUCOSE          Urinalysis Basic - ( 12 Apr 2025 05:30 )    Color: x / Appearance: x / SG: x / pH: x  Gluc: 80 mg/dL / Ketone: x  / Bili: x / Urobili: x   Blood: x / Protein: x / Nitrite: x   Leuk Esterase: x / RBC: x / WBC x   Sq Epi: x / Non Sq Epi: x / Bacteria: x

## 2025-04-13 VITALS
RESPIRATION RATE: 17 BRPM | DIASTOLIC BLOOD PRESSURE: 57 MMHG | SYSTOLIC BLOOD PRESSURE: 97 MMHG | TEMPERATURE: 98 F | HEART RATE: 69 BPM | OXYGEN SATURATION: 99 %

## 2025-04-13 DIAGNOSIS — K59.9 FUNCTIONAL INTESTINAL DISORDER, UNSPECIFIED: ICD-10-CM

## 2025-04-13 LAB
ANION GAP SERPL CALC-SCNC: 13 MMOL/L — SIGNIFICANT CHANGE UP (ref 5–17)
BUN SERPL-MCNC: 14 MG/DL — SIGNIFICANT CHANGE UP (ref 7–23)
CALCIUM SERPL-MCNC: 8.4 MG/DL — SIGNIFICANT CHANGE UP (ref 8.4–10.5)
CHLORIDE SERPL-SCNC: 102 MMOL/L — SIGNIFICANT CHANGE UP (ref 96–108)
CO2 SERPL-SCNC: 22 MMOL/L — SIGNIFICANT CHANGE UP (ref 22–31)
CREAT SERPL-MCNC: 1.09 MG/DL — SIGNIFICANT CHANGE UP (ref 0.5–1.3)
EGFR: 72 ML/MIN/1.73M2 — SIGNIFICANT CHANGE UP
EGFR: 72 ML/MIN/1.73M2 — SIGNIFICANT CHANGE UP
GLUCOSE SERPL-MCNC: 76 MG/DL — SIGNIFICANT CHANGE UP (ref 70–99)
HCT VFR BLD CALC: 37.7 % — LOW (ref 39–50)
HGB BLD-MCNC: 12.6 G/DL — LOW (ref 13–17)
MAGNESIUM SERPL-MCNC: 2.2 MG/DL — SIGNIFICANT CHANGE UP (ref 1.6–2.6)
MCHC RBC-ENTMCNC: 30.8 PG — SIGNIFICANT CHANGE UP (ref 27–34)
MCHC RBC-ENTMCNC: 33.4 G/DL — SIGNIFICANT CHANGE UP (ref 32–36)
MCV RBC AUTO: 92.2 FL — SIGNIFICANT CHANGE UP (ref 80–100)
NRBC BLD AUTO-RTO: 0 /100 WBCS — SIGNIFICANT CHANGE UP (ref 0–0)
PHOSPHATE SERPL-MCNC: 1.8 MG/DL — LOW (ref 2.5–4.5)
PLATELET # BLD AUTO: 144 K/UL — LOW (ref 150–400)
POTASSIUM SERPL-MCNC: 3.7 MMOL/L — SIGNIFICANT CHANGE UP (ref 3.5–5.3)
POTASSIUM SERPL-SCNC: 3.7 MMOL/L — SIGNIFICANT CHANGE UP (ref 3.5–5.3)
RBC # BLD: 4.09 M/UL — LOW (ref 4.2–5.8)
RBC # FLD: 14.3 % — SIGNIFICANT CHANGE UP (ref 10.3–14.5)
SODIUM SERPL-SCNC: 137 MMOL/L — SIGNIFICANT CHANGE UP (ref 135–145)
WBC # BLD: 7.34 K/UL — SIGNIFICANT CHANGE UP (ref 3.8–10.5)
WBC # FLD AUTO: 7.34 K/UL — SIGNIFICANT CHANGE UP (ref 3.8–10.5)

## 2025-04-13 PROCEDURE — 85025 COMPLETE CBC W/AUTO DIFF WBC: CPT

## 2025-04-13 PROCEDURE — 80053 COMPREHEN METABOLIC PANEL: CPT

## 2025-04-13 PROCEDURE — 86900 BLOOD TYPING SEROLOGIC ABO: CPT

## 2025-04-13 PROCEDURE — 85610 PROTHROMBIN TIME: CPT

## 2025-04-13 PROCEDURE — 83735 ASSAY OF MAGNESIUM: CPT

## 2025-04-13 PROCEDURE — 86850 RBC ANTIBODY SCREEN: CPT

## 2025-04-13 PROCEDURE — 71045 X-RAY EXAM CHEST 1 VIEW: CPT

## 2025-04-13 PROCEDURE — 74177 CT ABD & PELVIS W/CONTRAST: CPT | Mod: MC

## 2025-04-13 PROCEDURE — 93005 ELECTROCARDIOGRAM TRACING: CPT

## 2025-04-13 PROCEDURE — 88307 TISSUE EXAM BY PATHOLOGIST: CPT

## 2025-04-13 PROCEDURE — 85730 THROMBOPLASTIN TIME PARTIAL: CPT

## 2025-04-13 PROCEDURE — 86901 BLOOD TYPING SEROLOGIC RH(D): CPT

## 2025-04-13 PROCEDURE — 84100 ASSAY OF PHOSPHORUS: CPT

## 2025-04-13 PROCEDURE — 81003 URINALYSIS AUTO W/O SCOPE: CPT

## 2025-04-13 PROCEDURE — C1889: CPT

## 2025-04-13 PROCEDURE — 99285 EMERGENCY DEPT VISIT HI MDM: CPT

## 2025-04-13 PROCEDURE — 36415 COLL VENOUS BLD VENIPUNCTURE: CPT

## 2025-04-13 PROCEDURE — 80048 BASIC METABOLIC PNL TOTAL CA: CPT

## 2025-04-13 PROCEDURE — 99232 SBSQ HOSP IP/OBS MODERATE 35: CPT

## 2025-04-13 PROCEDURE — 85027 COMPLETE CBC AUTOMATED: CPT

## 2025-04-13 RX ORDER — SOD PHOS DI, MONO/K PHOS MONO 250 MG
1 TABLET ORAL
Refills: 0 | Status: COMPLETED | OUTPATIENT
Start: 2025-04-13 | End: 2025-04-13

## 2025-04-13 RX ORDER — IBUPROFEN 200 MG
1 TABLET ORAL
Qty: 21 | Refills: 0
Start: 2025-04-13 | End: 2025-04-19

## 2025-04-13 RX ORDER — ROSUVASTATIN CALCIUM 5 MG/1
1 TABLET, FILM COATED ORAL
Refills: 0 | DISCHARGE

## 2025-04-13 RX ORDER — ACETAMINOPHEN 500 MG/5ML
2 LIQUID (ML) ORAL
Qty: 42 | Refills: 0
Start: 2025-04-13 | End: 2025-04-19

## 2025-04-13 RX ORDER — OXYCODONE HYDROCHLORIDE 30 MG/1
1 TABLET ORAL
Qty: 4 | Refills: 0
Start: 2025-04-13 | End: 2025-04-13

## 2025-04-13 RX ORDER — SENNA 187 MG
2 TABLET ORAL
Refills: 0 | DISCHARGE

## 2025-04-13 RX ORDER — DOCUSATE SODIUM 100 MG
1 CAPSULE ORAL
Qty: 7 | Refills: 0
Start: 2025-04-13 | End: 2025-04-19

## 2025-04-13 RX ORDER — LINACLOTIDE 290 UG/1
1 CAPSULE, GELATIN COATED ORAL
Refills: 0 | DISCHARGE

## 2025-04-13 RX ORDER — ROSUVASTATIN CALCIUM 5 MG/1
1 TABLET, FILM COATED ORAL
Qty: 0 | Refills: 0 | DISCHARGE
Start: 2025-04-13

## 2025-04-13 RX ORDER — POLYETHYLENE GLYCOL 3350 17 G/17G
17 POWDER, FOR SOLUTION ORAL
Refills: 0 | DISCHARGE

## 2025-04-13 RX ADMIN — Medication 1 PACKET(S): at 10:41

## 2025-04-13 RX ADMIN — Medication 1000 MILLIGRAM(S): at 06:30

## 2025-04-13 RX ADMIN — Medication 1 PACKET(S): at 08:48

## 2025-04-13 RX ADMIN — KETOROLAC TROMETHAMINE 15 MILLIGRAM(S): 30 INJECTION, SOLUTION INTRAMUSCULAR; INTRAVENOUS at 06:30

## 2025-04-13 RX ADMIN — Medication 1000 MILLIGRAM(S): at 12:41

## 2025-04-13 RX ADMIN — KETOROLAC TROMETHAMINE 15 MILLIGRAM(S): 30 INJECTION, SOLUTION INTRAMUSCULAR; INTRAVENOUS at 12:41

## 2025-04-13 RX ADMIN — Medication 40 MILLIGRAM(S): at 06:30

## 2025-04-13 RX ADMIN — HEPARIN SODIUM 5000 UNIT(S): 1000 INJECTION INTRAVENOUS; SUBCUTANEOUS at 10:41

## 2025-04-13 RX ADMIN — Medication 20 MILLIEQUIVALENT(S): at 08:53

## 2025-04-13 NOTE — DISCHARGE NOTE PROVIDER - DETAILS OF MALNUTRITION DIAGNOSIS/DIAGNOSES
This patient has been assessed with a concern for Malnutrition and was treated during this hospitalization for the following Nutrition diagnosis/diagnoses:     -  04/11/2025: Underweight (BMI < 19)

## 2025-04-13 NOTE — DISCHARGE NOTE PROVIDER - NSDCCPCAREPLAN_GEN_ALL_CORE_FT
PRINCIPAL DISCHARGE DIAGNOSIS  Diagnosis: Colonic dysmotility  Assessment and Plan of Treatment:       SECONDARY DISCHARGE DIAGNOSES  Diagnosis: Abdominal pain  Assessment and Plan of Treatment:

## 2025-04-13 NOTE — PROGRESS NOTE ADULT - ASSESSMENT
73yo Cantonese-speaking Male pt with PMH of Gastric ulcers, incompetent ileocecal valve, bowel obstruction (2021) and PSHx of Exlap with Mike patch repair (1980s) and gastrectomy (1980) that is a patient of Dr. De Luna being treated for chronic constipation associated with increased bloating which has failed multiple medical interventions. Repeat CT imaging consisted with severely dilated colon unchanged from CT scan last year. Patient admitted to [facility] surgery for further management of colonic dysmotility. He is now s/p a subtotal colectomy (4/10)    LRD/IVDF  Pain and nausea control prn  Home meds as appropriate  SQH/SCD/OOBA  AM Labs

## 2025-04-13 NOTE — DISCHARGE NOTE NURSING/CASE MANAGEMENT/SOCIAL WORK - NSDCPEFALRISK_GEN_ALL_CORE
For information on Fall & Injury Prevention, visit: [email or web address] OR  [email or web address] OR  https://www.cdc.gov/steadi/patient.html

## 2025-04-13 NOTE — PROGRESS NOTE ADULT - NUTRITIONAL ASSESSMENT
This patient has been assessed with a concern for Malnutrition and has been determined to have a diagnosis/diagnoses of Underweight (BMI < 19).    This patient is being managed with:   Diet Low Fiber-  Entered: Apr 12 2025  9:03AM  

## 2025-04-13 NOTE — PROGRESS NOTE ADULT - SUBJECTIVE AND OBJECTIVE BOX
ON: -n/-v, lópez LRD, +f/+BM    SUBJECTIVE: Patient seen at bedside with chief resident. VOiding freely, pain well controlled, tolerating diet no nausea/vomiting.      MEDICATIONS  (STANDING):  acetaminophen     Tablet .. 1000 milliGRAM(s) Oral every 6 hours  famotidine  IVPB 20 milliGRAM(s) IV Intermittent daily  heparin   Injectable 5000 Unit(s) SubCutaneous every 12 hours  ketorolac   Injectable 15 milliGRAM(s) IV Push every 6 hours  lactated ringers. 1000 milliLiter(s) (40 mL/Hr) IV Continuous <Continuous>  pantoprazole    Tablet 40 milliGRAM(s) Oral before breakfast  rosuvastatin 10 milliGRAM(s) Oral at bedtime  tamsulosin 0.8 milliGRAM(s) Oral at bedtime    MEDICATIONS  (PRN):  HYDROmorphone  Injectable 0.5 milliGRAM(s) IV Push every 4 hours PRN Severe Pain (7 - 10)  ondansetron Injectable 4 milliGRAM(s) IV Push every 6 hours PRN Nausea and/or Vomiting      Vital Signs Last 24 Hrs  T(C): 36.7 (13 Apr 2025 04:30), Max: 36.7 (13 Apr 2025 04:30)  T(F): 98 (13 Apr 2025 04:30), Max: 98 (13 Apr 2025 04:30)  HR: 55 (13 Apr 2025 04:30) (55 - 72)  BP: 107/55 (13 Apr 2025 04:30) (107/55 - 142/81)  BP(mean): 82 (12 Apr 2025 20:40) (82 - 82)  RR: 17 (13 Apr 2025 04:30) (17 - 18)  SpO2: 96% (13 Apr 2025 04:30) (96% - 99%)    Parameters below as of 13 Apr 2025 04:30  Patient On (Oxygen Delivery Method): room air        PHYSICAL EXAM  General: NAD, resting comfortably  Pulmonary: normal resp effort  Cardiovascular: NSR  Abdominal: soft, NT/ND. Incisions c/d/i  Extremities: WWP, no clubbing/cyanosis/edema  Neuro: A/O x 3, no focal deficits    I&O's Detail    12 Apr 2025 07:01  -  13 Apr 2025 07:00  --------------------------------------------------------  IN:    IV PiggyBack: 250 mL    IV PiggyBack: 50 mL    Lactated Ringers: 720 mL  Total IN: 1020 mL    OUT:    Voided (mL): 800 mL  Total OUT: 800 mL    Total NET: 220 mL          LABS:                        11.0   7.38  )-----------( 122      ( 12 Apr 2025 05:30 )             32.3     04-12    140  |  109[H]  |  10  ----------------------------<  80  3.9   |  25  |  1.05    Ca    8.3[L]      12 Apr 2025 05:30  Phos  1.8     04-12  Mg     2.0     04-12        Urinalysis Basic - ( 12 Apr 2025 05:30 )    Color: x / Appearance: x / SG: x / pH: x  Gluc: 80 mg/dL / Ketone: x  / Bili: x / Urobili: x   Blood: x / Protein: x / Nitrite: x   Leuk Esterase: x / RBC: x / WBC x   Sq Epi: x / Non Sq Epi: x / Bacteria: x        RADIOLOGY & ADDITIONAL STUDIES:

## 2025-04-13 NOTE — DISCHARGE NOTE PROVIDER - NSDCMRMEDTOKEN_GEN_ALL_CORE_FT
Calcitriol: 50,000 milligram(s) orally every 2 weeks  Chelated Magnesium 100 mg oral tablet: 4 tab(s) orally once a day  docusate sodium 100 mg oral capsule: 1 cap(s) orally once a day as needed for  constipation  dutasteride 0.5 mg oral capsule: 1 cap(s) orally once a day  Flomax 0.4 mg oral capsule: 2 cap(s) orally once a day  linaclotide 290 mcg oral capsule: 1 cap(s) orally once a day (before a meal)  Motegrity 2 mg oral tablet: 1 tab(s) orally once a day  pantoprazole 40 mg oral delayed release tablet: 1 tab(s) orally once a day  Polyethylene Glycol 3350: 17 gram(s) orally 2 times a day  rosuvastatin 10 mg oral tablet: 1 tab(s) orally once a day (at bedtime)  Senna 8.6 mg oral tablet: 2 tab(s) orally 2 times a day   Calcitriol: 50,000 milligram(s) orally every 2 weeks  Chelated Magnesium 100 mg oral tablet: 4 tab(s) orally once a day  Colace 100 mg oral capsule: 1 cap(s) orally once a day  docusate sodium 100 mg oral capsule: 1 cap(s) orally once a day as needed for  constipation  dutasteride 0.5 mg oral capsule: 1 cap(s) orally once a day  Flomax 0.4 mg oral capsule: 2 cap(s) orally once a day  ibuprofen 400 mg oral tablet: 1 tab(s) orally every 8 hours  linaclotide 290 mcg oral capsule: 1 cap(s) orally once a day (before a meal)  Motegrity 2 mg oral tablet: 1 tab(s) orally once a day  oxyCODONE 5 mg oral tablet: 1 tab(s) orally every 6 hours as needed for  severe pain MDD: 4  pantoprazole 40 mg oral delayed release tablet: 1 tab(s) orally once a day  Polyethylene Glycol 3350: 17 gram(s) orally 2 times a day  rosuvastatin 10 mg oral tablet: 1 tab(s) orally once a day (at bedtime)  Senna 8.6 mg oral tablet: 2 tab(s) orally 2 times a day  Tylenol Extra Strength 500 mg oral tablet: 2 tab(s) orally every 8 hours   Calcitriol: 50,000 milligram(s) orally every 2 weeks  Chelated Magnesium 100 mg oral tablet: 4 tab(s) orally once a day  Colace 100 mg oral capsule: 1 cap(s) orally once a day  docusate sodium 100 mg oral capsule: 1 cap(s) orally once a day as needed for  constipation  dutasteride 0.5 mg oral capsule: 1 cap(s) orally once a day  Flomax 0.4 mg oral capsule: 2 cap(s) orally once a day  ibuprofen 400 mg oral tablet: 1 tab(s) orally every 8 hours  Motegrity 2 mg oral tablet: 1 tab(s) orally once a day  oxyCODONE 5 mg oral tablet: 1 tab(s) orally every 6 hours as needed for  severe pain MDD: 4  pantoprazole 40 mg oral delayed release tablet: 1 tab(s) orally once a day  rosuvastatin 10 mg oral tablet: 1 tab(s) orally once a day (at bedtime)  Tylenol Extra Strength 500 mg oral tablet: 2 tab(s) orally every 8 hours

## 2025-04-13 NOTE — DISCHARGE NOTE PROVIDER - NSDCCPTREATMENT_GEN_ALL_CORE_FT
PRINCIPAL PROCEDURE  Procedure: Open colectomy involving right side of colon  Findings and Treatment: TI, right, transverse, descending

## 2025-04-13 NOTE — PROGRESS NOTE ADULT - ASSESSMENT
A/P. Doing well  Having BM’s and tolerating diet  Voiding  Home today  Oxycodone script to be given  Advised to avoid fruit and vegetables  DPO f/u

## 2025-04-13 NOTE — PROGRESS NOTE ADULT - PROVIDER SPECIALTY LIST ADULT
Hospitalist
Colorectal Surgery
Colorectal Surgery
Surgery
Colorectal Surgery
Hospitalist
Hospitalist
Surgery
Surgery

## 2025-04-13 NOTE — DISCHARGE NOTE NURSING/CASE MANAGEMENT/SOCIAL WORK - FINANCIAL ASSISTANCE
[facility] provides services at a reduced cost to those who are determined to be eligible through [facility]’s financial assistance program. Information regarding [facility]’s financial assistance program can be found by going to [email or web address] or by calling [phone].

## 2025-04-13 NOTE — PROGRESS NOTE ADULT - REASON FOR ADMISSION
Colonic dysmotility

## 2025-04-13 NOTE — DISCHARGE NOTE PROVIDER - PROVIDER TOKENS
PROVIDER:[TOKEN:[[id number]],FOLLOWUP:[1 week],ESTABLISHEDPATIENT:[T]] PROVIDER:[TOKEN:[[id number]],FOLLOWUP:[1-3 days],ESTABLISHEDPATIENT:[T]]

## 2025-04-13 NOTE — PROGRESS NOTE ADULT - SUBJECTIVE AND OBJECTIVE BOX
RAFAT REED , [id number],  [facility] [address]UR [address] 02    Time of encounter :    CC :    T(C): 36.7 (04-13-25 @ 04:30), Max: 36.7 (04-13-25 @ 04:30)  HR: 55 (04-13-25 @ 04:30) (55 - 72)  BP: 107/55 (04-13-25 @ 04:30) (107/55 - 142/81)  RR: 17 (04-13-25 @ 04:30) (17 - 18)  SpO2: 96% (04-13-25 @ 04:30) (96% - 99%)    acetaminophen     Tablet .. 1000 milliGRAM(s) Oral every 6 hours  famotidine  IVPB 20 milliGRAM(s) IV Intermittent daily  heparin   Injectable 5000 Unit(s) SubCutaneous every 12 hours  HYDROmorphone  Injectable 0.5 milliGRAM(s) IV Push every 4 hours PRN  ketorolac   Injectable 15 milliGRAM(s) IV Push every 6 hours  lactated ringers. 1000 milliLiter(s) IV Continuous <Continuous>  ondansetron Injectable 4 milliGRAM(s) IV Push every 6 hours PRN  pantoprazole    Tablet 40 milliGRAM(s) Oral before breakfast  potassium chloride   Powder 20 milliEquivalent(s) Oral once  potassium phosphate / sodium phosphate Powder (PHOS-NaK) 1 Packet(s) Oral every 2 hours  rosuvastatin 10 milliGRAM(s) Oral at bedtime  tamsulosin 0.8 milliGRAM(s) Oral at bedtime      Physical Exam :    General exam :  well built, not in distress, saturating well on room air.  Eyes : EOMI, PERRL   CVS : RRR no murmur, JVP not elevated  Lungs : good air entry bilaterally   Abdomen : BS present, soft, not tender, not distended.  Extremities: no edema, no tenderness.  Neuro : AAO x 3 non focal.  Skin : warm and dry.   :  no marrero.      CBC Full  -  ( 13 Apr 2025 05:30 )  WBC Count : 7.34 K/uL  RBC Count : 4.09 M/uL  Hemoglobin : 12.6 g/dL  Hematocrit : 37.7 %  Platelet Count - Automated : 144 K/uL  Mean Cell Volume : 92.2 fl  Mean Cell Hemoglobin : 30.8 pg  Mean Cell Hemoglobin Concentration : 33.4 g/dL  Auto Neutrophil # : x  Auto Lymphocyte # : x  Auto Monocyte # : x  Auto Eosinophil # : x  Auto Basophil # : x  Auto Neutrophil % : x  Auto Lymphocyte % : x  Auto Monocyte % : x  Auto Eosinophil % : x  Auto Basophil % : x        04-13    137  |  102  |  14  ----------------------------<  76  3.7   |  22  |  1.09    Ca    8.4      13 Apr 2025 05:30  Phos  1.8     04-13  Mg     2.2     04-13      Daily     Daily   CAPILLARY BLOOD GLUCOSE          Urinalysis Basic - ( 13 Apr 2025 05:30 )    Color: x / Appearance: x / SG: x / pH: x  Gluc: 76 mg/dL / Ketone: x  / Bili: x / Urobili: x   Blood: x / Protein: x / Nitrite: x   Leuk Esterase: x / RBC: x / WBC x   Sq Epi: x / Non Sq Epi: x / Bacteria: x                   RAFAT REED , [id number],  [facility] [address]UR [address] 02    Time of encounter : 9 am   tolerating diet , mild pain in the abdomen- well controlled with current pain meds.  plan for home today- transportation set at 2 pm.       T(C): 36.7 (04-13-25 @ 04:30), Max: 36.7 (04-13-25 @ 04:30)  HR: 55 (04-13-25 @ 04:30) (55 - 72)  BP: 107/55 (04-13-25 @ 04:30) (107/55 - 142/81)  RR: 17 (04-13-25 @ 04:30) (17 - 18)  SpO2: 96% (04-13-25 @ 04:30) (96% - 99%)    acetaminophen     Tablet .. 1000 milliGRAM(s) Oral every 6 hours  famotidine  IVPB 20 milliGRAM(s) IV Intermittent daily  heparin   Injectable 5000 Unit(s) SubCutaneous every 12 hours  HYDROmorphone  Injectable 0.5 milliGRAM(s) IV Push every 4 hours PRN  ketorolac   Injectable 15 milliGRAM(s) IV Push every 6 hours  lactated ringers. 1000 milliLiter(s) IV Continuous <Continuous>  ondansetron Injectable 4 milliGRAM(s) IV Push every 6 hours PRN  pantoprazole    Tablet 40 milliGRAM(s) Oral before breakfast  potassium chloride   Powder 20 milliEquivalent(s) Oral once  potassium phosphate / sodium phosphate Powder (PHOS-NaK) 1 Packet(s) Oral every 2 hours  rosuvastatin 10 milliGRAM(s) Oral at bedtime  tamsulosin 0.8 milliGRAM(s) Oral at bedtime      Physical Exam :    General exam :  well built, not in distress, saturating well on room air.  CVS : RRR no murmur  Lungs : good air entry bilaterally   Abdomen : BS present, soft,  Extremities: no edema, no tenderness.  Neuro : AAO x 3 non focal.  Skin : warm and dry.   :  no marrero.      CBC Full  -  ( 13 Apr 2025 05:30 )  WBC Count : 7.34 K/uL  RBC Count : 4.09 M/uL  Hemoglobin : 12.6 g/dL  Hematocrit : 37.7 %  Platelet Count - Automated : 144 K/uL  Mean Cell Volume : 92.2 fl  Mean Cell Hemoglobin : 30.8 pg  Mean Cell Hemoglobin Concentration : 33.4 g/dL  Auto Neutrophil # : x  Auto Lymphocyte # : x  Auto Monocyte # : x  Auto Eosinophil # : x  Auto Basophil # : x  Auto Neutrophil % : x  Auto Lymphocyte % : x  Auto Monocyte % : x  Auto Eosinophil % : x  Auto Basophil % : x        04-13    137  |  102  |  14  ----------------------------<  76  3.7   |  22  |  1.09    Ca    8.4      13 Apr 2025 05:30  Phos  1.8     04-13  Mg     2.2     04-13      Daily     Daily   CAPILLARY BLOOD GLUCOSE          Urinalysis Basic - ( 13 Apr 2025 05:30 )    Color: x / Appearance: x / SG: x / pH: x  Gluc: 76 mg/dL / Ketone: x  / Bili: x / Urobili: x   Blood: x / Protein: x / Nitrite: x   Leuk Esterase: x / RBC: x / WBC x   Sq Epi: x / Non Sq Epi: x / Bacteria: x

## 2025-04-13 NOTE — DISCHARGE NOTE PROVIDER - HOSPITAL COURSE
[facility]    Brief Operative Note    Pre-operative diagnosis: Infection [B99.9]  Post-operative diagnosis Same as pre-operative diagnosis    Procedure: Incision and drainage upper extremity, combined, Right - Leg    Surgeon: Surgeons and Role:     * Drew Trores MD - Primary     * Genesis Israel MD - Assisting  Anesthesia: MAC with Local   Estimated Blood Loss: Minimal    Drains: None  Specimens:   ID Type Source Tests Collected by Time Destination   A : Right Hand #1 Wound Hand, Right ANAEROBIC BACTERIAL CULTURE ROUTINE, GRAM STAIN, FUNGAL OR YEAST CULTURE ROUTINE, AEROBIC BACTERIAL CULTURE ROUTINE Drew Torres MD 5/7/2024  8:52 AM    B : Right Hand #2 Wound Hand, Right ANAEROBIC BACTERIAL CULTURE ROUTINE, GRAM STAIN, FUNGAL OR YEAST CULTURE ROUTINE, AEROBIC BACTERIAL CULTURE ROUTINE Drew Torres MD 5/7/2024  8:53 AM      Findings:   See full op note   Complications: None.  Implants: * No implants in log *    Back to same bed post op.   Continue current Abx   Okay to discharge home today on oral antibiotics per primary team if patient wishes.   NWB RUE but okay to use hand for ADL's. Otherwise no heavy lifting until follow up.   Keep splint and dressings intact x 3 days. Then okay to remove splint and dressing. Place new dressing over incision and use same splint wrapped with ACE wrap.  Follow up in two weeks for wound check with foot and ankle nurse.              
73yo Cantonese-speaking Male pt with PMH of Gastric ulcers, incompetent ileocecal valve, bowel obstruction (2021) and PSHx of Exlap with Mike patch repair (1980s) and gastrectomy (1980) that is a patient of Dr. De Luna being treated for chronic constipation associated with increased bloating which has failed multiple medical interventions. Repeat CT imaging consisted with severely dilated colon unchanged from CT scan last year. Patient admitted to [facility] surgery for further management of colonic dysmotility. He is now s/p a subtotal colectomy (4/10), which had no complications. Today patient is feeling well, all the VS and blood work is within normal limits, the pain is well controlled on oral pain medication, tolerating diet without nausea, and ambulating independently - patient is stable and ready for discharge today.

## 2025-04-13 NOTE — PROGRESS NOTE ADULT - ASSESSMENT
75yo Cantonese/Mandrain-speaking Male pt with PMH of Gastric ulcers, incompetent ileocecal valve, bowel obstruction (2021) and PSHx of Exlap with Mike patch repair (1980s) and gastrectomy (1980) that is a patient of Dr. De Luna being treated for chronic constipation associated with increased bloating. Patient has been seen by many outpatient GI doctors and has failed trials of miralax, enemas, etc. Patient last saw Dr. De Luna on 3/19 and discussed potential total colectomy with possible ileosigmoid anastomosis with DLI to relieve chronic constipation 2/2 delayed transit and was scheduled for surgery 4/10. 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    - sp colonic resection, right colon,  4/10- pod # 3  , ambulating  low fiber diet.     - hx of BPH taking meds- did not recall the name  would give Tamsulosin 0.4 mg po q daily  voiding well.     reported HLD and stating medication at home.   no prior cardiac history ,   baseline good functional status reported.   EKG -4/8- reviewed:  NSR at 50 bpm,     pcp is Fausto Gonzalez.     Thank you for allowing medicine to participate in the care. dw primary team.   for discharge- to arrange transportation to home ( lives with elderly wife ) arranged for 2 pm today.

## 2025-04-13 NOTE — DISCHARGE NOTE NURSING/CASE MANAGEMENT/SOCIAL WORK - PATIENT PORTAL LINK FT
You can access the FollowMyHealth Patient Portal offered by [facility] by registering at the following website: [email or web address]. By joining [facility] [facility]’s FollowMyHealth portal, you will also be able to view your health information using other applications (apps) compatible with our system.

## 2025-04-13 NOTE — DISCHARGE NOTE PROVIDER - CARE PROVIDER_API CALL
Calixto Wong  Surgery  [facility] [facility]  [place] [address]  Phone: [phone]  Fax: [phone]  Established Patient  Follow Up Time: 1 week   Wong De Luna  Surgery  [facility] [facility]  [place] [address]  Phone: [phone]  Fax: [phone]  Established Patient  Follow Up Time: 1-3 days

## 2025-04-13 NOTE — PROGRESS NOTE ADULT - SUBJECTIVE AND OBJECTIVE BOX
POD 3  9 [place]ris  Covering for Dr. De Luna    On low residue diet and having loose BM.  No N/V  Voiding well but not tracking volumes well  Ambulating   Taking oxycodone and Tylenol    Vital Signs Last 24 Hrs  T(C): 36.5 (13 Apr 2025 08:47), Max: 36.7 (13 Apr 2025 04:30)  T(F): 97.7 (13 Apr 2025 08:47), Max: 98 (13 Apr 2025 04:30)  HR: 70 (13 Apr 2025 08:47) (55 - 72)  BP: 100/61 (13 Apr 2025 08:47) (100/61 - 142/81)  BP(mean): 82 (12 Apr 2025 20:40) (82 - 82)  RR: 17 (13 Apr 2025 08:47) (17 - 18)  SpO2: 99% (13 Apr 2025 08:47) (96% - 99%)    Parameters below as of 13 Apr 2025 08:47  Patient On (Oxygen Delivery Method): room air    Abd:  incision intact minus drainage, not distended.  Ext: without calf tenderness     UO: 800 ml/last shift                        12.6   7.34  )-----------( 144      ( 13 Apr 2025 05:30 )             37.7   04-13    137  |  102  |  14  ----------------------------<  76  3.7   |  22  |  1.09    Ca    8.4      13 Apr 2025 05:30  Phos  1.8     04-13  Mg     2.2     04-13

## 2025-04-13 NOTE — DISCHARGE NOTE PROVIDER - NSDCFUADDINST_GEN_ALL_CORE_FT
Please follow up with Dr. De Luna in one week; you may call the office to make an appointment at your earliest convenience.    General Discharge Instructions:  Please resume all regular home medications unless specifically advised not to take a particular medication. Also, please take any new medications as prescribed.  Please get plenty of rest, continue to ambulate several times per day, and drink adequate amounts of fluids. Avoid lifting weights greater than 5-10 lbs until you follow-up with your surgeon, who will instruct you further regarding activity restrictions.  Avoid driving or operating heavy machinery while taking pain medications.  Please follow-up with your surgeon and Primary Care Provider (PCP) as advised.  Incision Care:  *Please call your doctor or nurse practitioner if you have increased pain, swelling, redness, or drainage from the incision site.  *Avoid swimming and baths until your follow-up appointment.  *You may shower, and wash surgical incisions with a mild soap and warm water. Gently pat the area dry.  *If you have staples, they will be removed at your follow-up appointment.    Warning Signs:  Please call your doctor or nurse practitioner if you experience the following:  *You experience new chest pain, pressure, squeezing or tightness.  *New or worsening cough, shortness of breath, or wheeze.  *If you are vomiting and cannot keep down fluids or your medications.  *You are getting dehydrated due to continued vomiting, diarrhea, or other reasons. Signs of dehydration include dry mouth, rapid heartbeat, or feeling dizzy or faint when standing.  *You see blood or dark/black material when you vomit or have a bowel movement.  *You experience burning when you urinate, have blood in your urine, or experience a discharge.  *Your pain is not improving within 8-12 hours or is not gone within 24 hours. Call or return immediately if your pain is getting worse, changes location, or moves to your chest or back.  *You have shaking chills, or fever greater than 101.5 degrees Fahrenheit or 38 degrees Celsius.  *Any change in your symptoms, or any new symptoms that concern you.      Please continue a LOW-FIBER DIET. Listed below are some foods you may eat and those you should avoid.   --Allowed foods:  White bread without nuts and seeds  White rice, plain white pasta, and crackers  Refined hot cereals, such as Cream of Wheat, or cold cereals with less than 1 gram of fiber per serving  Pancakes or waffles made from white refined flour  Most canned or well-cooked vegetables and fruits without skins or seeds  Fruit and vegetable juice with little or no pulp, fruit-flavored drinks, and flavored conner  Tender meat, poultry, fish, eggs and tofu  Milk and foods made from milk — such as yogurt, pudding, ice cream, cheeses and sour cream — if tolerated  Butter, margarine, oils and salad dressings without seeds  --Foods to avoid:  Whole-wheat or whole-grain breads, cereals and pasta  Brown or wild rice and other whole grains, such as oats, kasha, barley and quinoa  Dried fruits and prune juice  Raw fruit, including those with seeds, skin or membranes, such as berries  Raw or undercooked vegetables, including corn  Dried beans, peas and lentils  Seeds and nuts and foods containing them, including peanut butter and other nut butters  Coconut  Popcorn    Please take Tylenol 1000mg every 6 hours for pain. You may alternate every 3 hours with ibuprofen as needed.   Please take oxycodone 5mg every 6 hours as needed for pain. Take colace 100mg daily while taking oxycodone to prevent constipation. Please follow up with Dr. De Luna on 4/28; you may call the office to make an appointment at your earliest convenience.    General Discharge Instructions:  Please resume all regular home medications unless specifically advised not to take a particular medication. Also, please take any new medications as prescribed.  Please get plenty of rest, continue to ambulate several times per day, and drink adequate amounts of fluids. Avoid lifting weights greater than 5-10 lbs until you follow-up with your surgeon, who will instruct you further regarding activity restrictions.  Avoid driving or operating heavy machinery while taking pain medications.  Please follow-up with your surgeon and Primary Care Provider (PCP) as advised.  Incision Care:  *Please call your doctor or nurse practitioner if you have increased pain, swelling, redness, or drainage from the incision site.  *Avoid swimming and baths until your follow-up appointment.  *You may shower, and wash surgical incisions with a mild soap and warm water. Gently pat the area dry.  *If you have staples, they will be removed at your follow-up appointment.    Warning Signs:  Please call your doctor or nurse practitioner if you experience the following:  *You experience new chest pain, pressure, squeezing or tightness.  *New or worsening cough, shortness of breath, or wheeze.  *If you are vomiting and cannot keep down fluids or your medications.  *You are getting dehydrated due to continued vomiting, diarrhea, or other reasons. Signs of dehydration include dry mouth, rapid heartbeat, or feeling dizzy or faint when standing.  *You see blood or dark/black material when you vomit or have a bowel movement.  *You experience burning when you urinate, have blood in your urine, or experience a discharge.  *Your pain is not improving within 8-12 hours or is not gone within 24 hours. Call or return immediately if your pain is getting worse, changes location, or moves to your chest or back.  *You have shaking chills, or fever greater than 101.5 degrees Fahrenheit or 38 degrees Celsius.  *Any change in your symptoms, or any new symptoms that concern you.      Please continue a LOW-FIBER DIET. Listed below are some foods you may eat and those you should avoid.   --Allowed foods:  White bread without nuts and seeds  White rice, plain white pasta, and crackers  Refined hot cereals, such as Cream of Wheat, or cold cereals with less than 1 gram of fiber per serving  Pancakes or waffles made from white refined flour  Most canned or well-cooked vegetables and fruits without skins or seeds  Fruit and vegetable juice with little or no pulp, fruit-flavored drinks, and flavored conner  Tender meat, poultry, fish, eggs and tofu  Milk and foods made from milk — such as yogurt, pudding, ice cream, cheeses and sour cream — if tolerated  Butter, margarine, oils and salad dressings without seeds  --Foods to avoid:  Whole-wheat or whole-grain breads, cereals and pasta  Brown or wild rice and other whole grains, such as oats, kasha, barley and quinoa  Dried fruits and prune juice  Raw fruit, including those with seeds, skin or membranes, such as berries  Raw or undercooked vegetables, including corn  Dried beans, peas and lentils  Seeds and nuts and foods containing them, including peanut butter and other nut butters  Coconut  Popcorn    Please take Tylenol 1000mg every 8 hours for pain. You may alternate with ibuprofen 400mg as needed.   Please take oxycodone 5mg every 8hours as needed for pain. Take colace 100mg daily while taking oxycodone to prevent constipation.

## 2025-04-18 DIAGNOSIS — N40.0 BENIGN PROSTATIC HYPERPLASIA WITHOUT LOWER URINARY TRACT SYMPTOMS: ICD-10-CM

## 2025-04-18 DIAGNOSIS — K59.89 OTHER SPECIFIED FUNCTIONAL INTESTINAL DISORDERS: ICD-10-CM

## 2025-04-18 DIAGNOSIS — Z87.891 PERSONAL HISTORY OF NICOTINE DEPENDENCE: ICD-10-CM

## 2025-04-18 DIAGNOSIS — E78.5 HYPERLIPIDEMIA, UNSPECIFIED: ICD-10-CM

## 2025-04-18 DIAGNOSIS — R63.6 UNDERWEIGHT: ICD-10-CM

## 2025-04-22 RX ORDER — POLYETHYLENE GLYCOL 3350 17 G/17G
17 POWDER, FOR SOLUTION ORAL DAILY
Qty: 1 | Refills: 1 | Status: ACTIVE | COMMUNITY
Start: 2025-04-22 | End: 1900-01-01

## 2025-04-28 ENCOUNTER — APPOINTMENT (OUTPATIENT)
Dept: COLORECTAL SURGERY | Facility: CLINIC | Age: 75
End: 2025-04-28
Payer: MEDICARE

## 2025-04-28 VITALS
HEART RATE: 74 BPM | TEMPERATURE: 97.3 F | SYSTOLIC BLOOD PRESSURE: 99 MMHG | DIASTOLIC BLOOD PRESSURE: 63 MMHG | BODY MASS INDEX: 19.14 KG/M2 | WEIGHT: 104 LBS | HEIGHT: 62 IN

## 2025-04-28 DIAGNOSIS — K59.01 SLOW TRANSIT CONSTIPATION: ICD-10-CM

## 2025-04-28 PROCEDURE — 99024 POSTOP FOLLOW-UP VISIT: CPT
